# Patient Record
Sex: FEMALE | Race: WHITE | NOT HISPANIC OR LATINO | Employment: OTHER
[De-identification: names, ages, dates, MRNs, and addresses within clinical notes are randomized per-mention and may not be internally consistent; named-entity substitution may affect disease eponyms.]

---

## 2016-12-01 LAB — HBA1C MFR BLD: 11.9 % (ref 4.3–5.6)

## 2017-04-18 ENCOUNTER — RECORDS - HEALTHEAST (OUTPATIENT)
Dept: ADMINISTRATIVE | Facility: OTHER | Age: 67
End: 2017-04-18

## 2017-04-18 LAB — HBA1C MFR BLD: 5.3 % (ref 4.3–5.6)

## 2017-06-01 ENCOUNTER — RECORDS - HEALTHEAST (OUTPATIENT)
Dept: ADMINISTRATIVE | Facility: OTHER | Age: 67
End: 2017-06-01

## 2017-06-01 LAB — HBA1C MFR BLD: 5.5 % (ref 4.3–5.6)

## 2017-07-19 ENCOUNTER — RECORDS - HEALTHEAST (OUTPATIENT)
Dept: ADMINISTRATIVE | Facility: OTHER | Age: 67
End: 2017-07-19

## 2017-09-19 ENCOUNTER — RECORDS - HEALTHEAST (OUTPATIENT)
Dept: ADMINISTRATIVE | Facility: OTHER | Age: 67
End: 2017-09-19

## 2017-09-25 ENCOUNTER — RECORDS - HEALTHEAST (OUTPATIENT)
Dept: ADMINISTRATIVE | Facility: OTHER | Age: 67
End: 2017-09-25

## 2017-10-16 ENCOUNTER — RECORDS - HEALTHEAST (OUTPATIENT)
Dept: ADMINISTRATIVE | Facility: OTHER | Age: 67
End: 2017-10-16

## 2017-10-24 ENCOUNTER — RECORDS - HEALTHEAST (OUTPATIENT)
Dept: ADMINISTRATIVE | Facility: OTHER | Age: 67
End: 2017-10-24

## 2017-10-24 LAB — HBA1C MFR BLD: 5.9 % (ref 4.3–5.6)

## 2017-11-13 ENCOUNTER — RECORDS - HEALTHEAST (OUTPATIENT)
Dept: ADMINISTRATIVE | Facility: OTHER | Age: 67
End: 2017-11-13

## 2017-11-20 ENCOUNTER — RECORDS - HEALTHEAST (OUTPATIENT)
Dept: ADMINISTRATIVE | Facility: OTHER | Age: 67
End: 2017-11-20

## 2018-02-13 ENCOUNTER — RECORDS - HEALTHEAST (OUTPATIENT)
Dept: ADMINISTRATIVE | Facility: OTHER | Age: 68
End: 2018-02-13

## 2018-02-13 ENCOUNTER — AMBULATORY - HEALTHEAST (OUTPATIENT)
Dept: MULTI SPECIALTY CLINIC | Facility: CLINIC | Age: 68
End: 2018-02-13

## 2018-04-09 ENCOUNTER — RECORDS - HEALTHEAST (OUTPATIENT)
Dept: ADMINISTRATIVE | Facility: OTHER | Age: 68
End: 2018-04-09

## 2018-11-08 ENCOUNTER — RECORDS - HEALTHEAST (OUTPATIENT)
Dept: ADMINISTRATIVE | Facility: OTHER | Age: 68
End: 2018-11-08

## 2018-11-08 LAB — HBA1C MFR BLD: 6.8 % (ref 4.3–5.6)

## 2018-11-12 ENCOUNTER — RECORDS - HEALTHEAST (OUTPATIENT)
Dept: ADMINISTRATIVE | Facility: OTHER | Age: 68
End: 2018-11-12

## 2018-12-20 ENCOUNTER — RECORDS - HEALTHEAST (OUTPATIENT)
Dept: ADMINISTRATIVE | Facility: OTHER | Age: 68
End: 2018-12-20

## 2018-12-20 ENCOUNTER — AMBULATORY - HEALTHEAST (OUTPATIENT)
Dept: MULTI SPECIALTY CLINIC | Facility: CLINIC | Age: 68
End: 2018-12-20

## 2018-12-20 LAB
RETINOPATHY: NORMAL
RETINOPATHY: NORMAL

## 2019-06-06 ENCOUNTER — RECORDS - HEALTHEAST (OUTPATIENT)
Dept: ADMINISTRATIVE | Facility: OTHER | Age: 69
End: 2019-06-06

## 2019-10-28 ENCOUNTER — RECORDS - HEALTHEAST (OUTPATIENT)
Dept: ADMINISTRATIVE | Facility: OTHER | Age: 69
End: 2019-10-28

## 2019-11-21 ENCOUNTER — OFFICE VISIT - HEALTHEAST (OUTPATIENT)
Dept: FAMILY MEDICINE | Facility: CLINIC | Age: 69
End: 2019-11-21

## 2019-11-21 DIAGNOSIS — E03.9 ACQUIRED HYPOTHYROIDISM: ICD-10-CM

## 2019-11-21 DIAGNOSIS — E11.9 CONTROLLED TYPE 2 DIABETES MELLITUS WITHOUT COMPLICATION, WITHOUT LONG-TERM CURRENT USE OF INSULIN (H): ICD-10-CM

## 2019-11-21 DIAGNOSIS — I10 ESSENTIAL HYPERTENSION: ICD-10-CM

## 2019-11-21 DIAGNOSIS — Z00.00 ROUTINE GENERAL MEDICAL EXAMINATION AT A HEALTH CARE FACILITY: ICD-10-CM

## 2019-11-21 DIAGNOSIS — Z87.39 HISTORY OF GOUT: ICD-10-CM

## 2019-11-21 DIAGNOSIS — Z12.31 SCREENING MAMMOGRAM, ENCOUNTER FOR: ICD-10-CM

## 2019-11-21 DIAGNOSIS — K22.2 ESOPHAGEAL STRICTURE: ICD-10-CM

## 2019-11-21 DIAGNOSIS — E78.2 MIXED HYPERLIPIDEMIA: ICD-10-CM

## 2019-11-21 DIAGNOSIS — Z23 FLU VACCINE NEED: ICD-10-CM

## 2019-11-21 DIAGNOSIS — I25.10 ASCVD (ARTERIOSCLEROTIC CARDIOVASCULAR DISEASE): ICD-10-CM

## 2019-11-21 DIAGNOSIS — D12.6 TUBULAR ADENOMA OF COLON: ICD-10-CM

## 2019-11-21 LAB
ALBUMIN SERPL-MCNC: 4.1 G/DL (ref 3.5–5)
ALP SERPL-CCNC: 92 U/L (ref 45–120)
ALT SERPL W P-5'-P-CCNC: 28 U/L (ref 0–45)
ANION GAP SERPL CALCULATED.3IONS-SCNC: 8 MMOL/L (ref 5–18)
AST SERPL W P-5'-P-CCNC: 25 U/L (ref 0–40)
BILIRUB SERPL-MCNC: 0.6 MG/DL (ref 0–1)
BUN SERPL-MCNC: 7 MG/DL (ref 8–28)
CALCIUM SERPL-MCNC: 10.1 MG/DL (ref 8.5–10.5)
CHLORIDE BLD-SCNC: 107 MMOL/L (ref 98–107)
CHOLEST SERPL-MCNC: 139 MG/DL
CO2 SERPL-SCNC: 27 MMOL/L (ref 22–31)
CREAT SERPL-MCNC: 0.93 MG/DL (ref 0.6–1.1)
FASTING STATUS PATIENT QL REPORTED: YES
GFR SERPL CREATININE-BSD FRML MDRD: 59 ML/MIN/1.73M2
GLUCOSE BLD-MCNC: 116 MG/DL (ref 70–125)
HBA1C MFR BLD: 6 % (ref 3.5–6)
HDLC SERPL-MCNC: 44 MG/DL
LDLC SERPL CALC-MCNC: 63 MG/DL
POTASSIUM BLD-SCNC: 5.1 MMOL/L (ref 3.5–5)
PROT SERPL-MCNC: 7.5 G/DL (ref 6–8)
SODIUM SERPL-SCNC: 142 MMOL/L (ref 136–145)
TRIGL SERPL-MCNC: 162 MG/DL
TSH SERPL DL<=0.005 MIU/L-ACNC: 2.75 UIU/ML (ref 0.3–5)

## 2019-11-21 RX ORDER — ASPIRIN 325 MG
325 TABLET ORAL
Status: SHIPPED | COMMUNITY
Start: 2019-11-21

## 2019-11-21 RX ORDER — INDOMETHACIN 25 MG/1
25 CAPSULE ORAL PRN
Status: SHIPPED | COMMUNITY
Start: 2014-06-30 | End: 2021-12-29

## 2019-11-21 ASSESSMENT — MIFFLIN-ST. JEOR: SCORE: 1390.27

## 2019-11-21 NOTE — ASSESSMENT & PLAN NOTE
Recommended that the patient take her omeprazole daily. Repeat endoscopy refused. Discussed having NTG on hand to take in case of an episode of un resolving symptoms but this was refused. She will continue with eating slowly which is working well.

## 2019-11-21 NOTE — ASSESSMENT & PLAN NOTE
Asymptomatic at this time. Continue risk factor management. Recommended that the patient have NTG at home in case of need but this was refused.

## 2019-11-22 ENCOUNTER — RECORDS - HEALTHEAST (OUTPATIENT)
Dept: MULTI SPECIALTY CLINIC | Facility: CLINIC | Age: 69
End: 2019-11-22

## 2019-11-22 ENCOUNTER — COMMUNICATION - HEALTHEAST (OUTPATIENT)
Dept: FAMILY MEDICINE | Facility: CLINIC | Age: 69
End: 2019-11-22

## 2019-11-22 DIAGNOSIS — E11.9 CONTROLLED TYPE 2 DIABETES MELLITUS WITHOUT COMPLICATION, WITHOUT LONG-TERM CURRENT USE OF INSULIN (H): ICD-10-CM

## 2019-11-22 LAB — HCV AB SERPL QL IA: NEGATIVE

## 2019-11-28 ENCOUNTER — RECORDS - HEALTHEAST (OUTPATIENT)
Dept: ADMINISTRATIVE | Facility: OTHER | Age: 69
End: 2019-11-28

## 2019-12-10 ENCOUNTER — RECORDS - HEALTHEAST (OUTPATIENT)
Dept: HEALTH INFORMATION MANAGEMENT | Facility: CLINIC | Age: 69
End: 2019-12-10

## 2019-12-19 ENCOUNTER — RECORDS - HEALTHEAST (OUTPATIENT)
Dept: ADMINISTRATIVE | Facility: OTHER | Age: 69
End: 2019-12-19

## 2020-01-16 ENCOUNTER — COMMUNICATION - HEALTHEAST (OUTPATIENT)
Dept: FAMILY MEDICINE | Facility: CLINIC | Age: 70
End: 2020-01-16

## 2020-05-07 ENCOUNTER — COMMUNICATION - HEALTHEAST (OUTPATIENT)
Dept: FAMILY MEDICINE | Facility: CLINIC | Age: 70
End: 2020-05-07

## 2020-11-16 ENCOUNTER — COMMUNICATION - HEALTHEAST (OUTPATIENT)
Dept: FAMILY MEDICINE | Facility: CLINIC | Age: 70
End: 2020-11-16

## 2020-11-16 DIAGNOSIS — I10 ESSENTIAL HYPERTENSION: ICD-10-CM

## 2020-11-16 DIAGNOSIS — E11.9 CONTROLLED TYPE 2 DIABETES MELLITUS WITHOUT COMPLICATION, WITHOUT LONG-TERM CURRENT USE OF INSULIN (H): ICD-10-CM

## 2020-11-16 DIAGNOSIS — E78.2 MIXED HYPERLIPIDEMIA: ICD-10-CM

## 2020-11-16 DIAGNOSIS — E03.9 ACQUIRED HYPOTHYROIDISM: ICD-10-CM

## 2020-11-23 ENCOUNTER — COMMUNICATION - HEALTHEAST (OUTPATIENT)
Dept: FAMILY MEDICINE | Facility: CLINIC | Age: 70
End: 2020-11-23

## 2021-01-21 ENCOUNTER — OFFICE VISIT - HEALTHEAST (OUTPATIENT)
Dept: FAMILY MEDICINE | Facility: CLINIC | Age: 71
End: 2021-01-21

## 2021-01-21 DIAGNOSIS — E78.2 MIXED HYPERLIPIDEMIA: ICD-10-CM

## 2021-01-21 DIAGNOSIS — N18.31 STAGE 3A CHRONIC KIDNEY DISEASE (H): ICD-10-CM

## 2021-01-21 DIAGNOSIS — E11.9 CONTROLLED TYPE 2 DIABETES MELLITUS WITHOUT COMPLICATION, WITHOUT LONG-TERM CURRENT USE OF INSULIN (H): ICD-10-CM

## 2021-01-21 DIAGNOSIS — K21.9 GASTROESOPHAGEAL REFLUX DISEASE WITHOUT ESOPHAGITIS: ICD-10-CM

## 2021-01-21 DIAGNOSIS — J30.0 VASOMOTOR RHINITIS: ICD-10-CM

## 2021-01-21 DIAGNOSIS — M75.02 ADHESIVE CAPSULITIS OF LEFT SHOULDER: ICD-10-CM

## 2021-01-21 DIAGNOSIS — Z12.31 SCREENING MAMMOGRAM, ENCOUNTER FOR: ICD-10-CM

## 2021-01-21 DIAGNOSIS — D12.6 TUBULAR ADENOMA OF COLON: ICD-10-CM

## 2021-01-21 DIAGNOSIS — I25.10 ASCVD (ARTERIOSCLEROTIC CARDIOVASCULAR DISEASE): ICD-10-CM

## 2021-01-21 DIAGNOSIS — Z00.00 ROUTINE GENERAL MEDICAL EXAMINATION AT A HEALTH CARE FACILITY: ICD-10-CM

## 2021-01-21 DIAGNOSIS — I10 ESSENTIAL HYPERTENSION: ICD-10-CM

## 2021-01-21 DIAGNOSIS — E03.9 ACQUIRED HYPOTHYROIDISM: ICD-10-CM

## 2021-01-21 LAB
ALBUMIN SERPL-MCNC: 4.3 G/DL (ref 3.5–5)
ALP SERPL-CCNC: 83 U/L (ref 45–120)
ALT SERPL W P-5'-P-CCNC: 54 U/L (ref 0–45)
ANION GAP SERPL CALCULATED.3IONS-SCNC: 11 MMOL/L (ref 5–18)
AST SERPL W P-5'-P-CCNC: 43 U/L (ref 0–40)
BILIRUB SERPL-MCNC: 0.7 MG/DL (ref 0–1)
BUN SERPL-MCNC: 8 MG/DL (ref 8–28)
CALCIUM SERPL-MCNC: 9.5 MG/DL (ref 8.5–10.5)
CHLORIDE BLD-SCNC: 108 MMOL/L (ref 98–107)
CHOLEST SERPL-MCNC: 158 MG/DL
CO2 SERPL-SCNC: 24 MMOL/L (ref 22–31)
CREAT SERPL-MCNC: 1.03 MG/DL (ref 0.6–1.1)
CREAT UR-MCNC: 93.9 MG/DL
FASTING STATUS PATIENT QL REPORTED: YES
GFR SERPL CREATININE-BSD FRML MDRD: 53 ML/MIN/1.73M2
GLUCOSE BLD-MCNC: 109 MG/DL (ref 70–125)
HBA1C MFR BLD: 6.1 %
HDLC SERPL-MCNC: 46 MG/DL
LDLC SERPL CALC-MCNC: 83 MG/DL
MICROALBUMIN UR-MCNC: 3.69 MG/DL (ref 0–1.99)
MICROALBUMIN/CREAT UR: 39.3 MG/G
POTASSIUM BLD-SCNC: 4.1 MMOL/L (ref 3.5–5)
PROT SERPL-MCNC: 7.4 G/DL (ref 6–8)
SODIUM SERPL-SCNC: 143 MMOL/L (ref 136–145)
TRIGL SERPL-MCNC: 143 MG/DL
TSH SERPL DL<=0.005 MIU/L-ACNC: 2.43 UIU/ML (ref 0.3–5)

## 2021-01-21 RX ORDER — LEVOTHYROXINE SODIUM 100 UG/1
100 TABLET ORAL DAILY
Qty: 90 TABLET | Refills: 3 | Status: SHIPPED | OUTPATIENT
Start: 2021-01-21 | End: 2022-02-08

## 2021-01-21 RX ORDER — METFORMIN HCL 500 MG
1000 TABLET, EXTENDED RELEASE 24 HR ORAL 2 TIMES DAILY
Qty: 360 TABLET | Refills: 3 | Status: SHIPPED | OUTPATIENT
Start: 2021-01-21 | End: 2022-02-08

## 2021-01-21 RX ORDER — OMEPRAZOLE 20 MG/1
20 TABLET, DELAYED RELEASE ORAL PRN
Qty: 90 TABLET | Refills: 3 | Status: SHIPPED | OUTPATIENT
Start: 2021-01-21 | End: 2022-02-08

## 2021-01-21 RX ORDER — LISINOPRIL 20 MG/1
20 TABLET ORAL DAILY
Qty: 90 TABLET | Refills: 3 | Status: SHIPPED | OUTPATIENT
Start: 2021-01-21 | End: 2022-02-08

## 2021-01-21 RX ORDER — ATENOLOL 100 MG/1
100 TABLET ORAL DAILY
Qty: 90 TABLET | Refills: 3 | Status: SHIPPED | OUTPATIENT
Start: 2021-01-21 | End: 2022-02-08

## 2021-01-21 ASSESSMENT — MIFFLIN-ST. JEOR: SCORE: 1414.15

## 2021-01-21 NOTE — ASSESSMENT & PLAN NOTE
No history of traumatic injury. Treatment options discussed. Order placed for physical therapy. She was also given exercises to start at home. If not improving, ortho referral would be the next step.

## 2021-01-24 ENCOUNTER — COMMUNICATION - HEALTHEAST (OUTPATIENT)
Dept: FAMILY MEDICINE | Facility: CLINIC | Age: 71
End: 2021-01-24

## 2021-06-03 VITALS
BODY MASS INDEX: 32.34 KG/M2 | DIASTOLIC BLOOD PRESSURE: 82 MMHG | HEIGHT: 65 IN | OXYGEN SATURATION: 95 % | HEART RATE: 60 BPM | SYSTOLIC BLOOD PRESSURE: 138 MMHG | WEIGHT: 194.1 LBS

## 2021-06-03 NOTE — PROGRESS NOTES
Assessment and Plan:     Problem List Items Addressed This Visit     Acquired hypothyroidism     TSH today. Dosing based on results.         Relevant Medications    levothyroxine (SYNTHROID, LEVOTHROID) 100 MCG tablet    Other Relevant Orders    Thyroid Stimulating Hormone (TSH)    ASCVD (arteriosclerotic cardiovascular disease)     Asymptomatic at this time. Continue risk factor management. Recommended that the patient have NTG at home in case of need but this was refused.         Controlled type 2 diabetes mellitus without complication, without long-term current use of insulin (H)     Metformin 1000 mg twice daily. A1C today. Plan and follow up will be based on results.         Relevant Medications    blood glucose test (GLUCOSE BLOOD) strips    metFORMIN (GLUCOPHAGE-XR) 500 MG 24 hr tablet    Other Relevant Orders    Glycosylated Hemoglobin A1c    Comprehensive Metabolic Panel    Esophageal stricture     Recommended that the patient take her omeprazole daily. Repeat endoscopy refused. Discussed having NTG on hand to take in case of an episode of un resolving symptoms but this was refused. She will continue with eating slowly which is working well.         Essential hypertension     Well controlled. Continue Atenolol 100 mg daily and lisinopril 20 mg daily.         Relevant Medications    atenolol (TENORMIN) 100 MG tablet    lisinopril (PRINIVIL,ZESTRIL) 20 MG tablet    Other Relevant Orders    Comprehensive Metabolic Panel    History of gout     Currently asymptomatic. Indomethacin as needed.         Mixed hyperlipidemia     Continue atorvastatin 40 mg daily. Lipid panel and CMP today.         Relevant Medications    atorvastatin (LIPITOR) 40 MG tablet    Other Relevant Orders    Lipid Profile    Comprehensive Metabolic Panel    Tubular adenoma of colon     Colonoscopy up to date. Last done 7/2016.           Other Visit Diagnoses     Routine general medical examination at a health care facility    -  Primary     Relevant Orders    Hepatitis C Antibody (Anti-HCV)    Screening mammogram, encounter for        Relevant Orders    Mammo Screening Bilateral            The patient's current medical problems were reviewed.    I have had an Advance Directives discussion with the patient.  The following health maintenance schedule was reviewed with the patient and provided in printed form in the after visit summary:   Health Maintenance   Topic Date Due     HEPATITIS C SCREENING  1950     BMP  1950     DIABETIC FOOT EXAM  1950     MAMMOGRAM  1950     LIPID  1950     A1C  1950     DIABETIC EYE EXAM  1950     MICROALBUMIN  1950     ZOSTER VACCINES (2 of 3) 07/17/2012     DXA SCAN  03/08/2013     INFLUENZA VACCINE RULE BASED (1) 08/01/2019     TD 18+ HE  03/12/2020     MEDICARE ANNUAL WELLNESS VISIT  11/21/2020     FALL RISK ASSESSMENT  11/21/2020     COLONOSCOPY  07/25/2021     ADVANCE CARE PLANNING  11/21/2024     PNEUMOCOCCAL IMMUNIZATION 65+ LOW/MEDIUM RISK  Completed        Subjective:   Chief Complaint: Prabha Cabral is an 69 y.o. female here for an Annual Wellness visit.   HPI:  She also needs follow up on her diabetes. She ran out of test strips in July so hasn't checked since then. Numbers were well controlled at that time. She has been taking her medications regularly.    She also has a history of esophageal stricture. She has had dilatation in the past but it hasn't been very helpful. She reports that she does OK most of the time if she eats very slowly. She refuses repeat endoscopy at this time. She isn't taking the omeprazole daily as she denies reflux symptoms.    Review of Systems:  Please see above.  The rest of the review of systems are negative for all systems.    Patient Care Team:  Provider, No Primary Care as PCP - General     Patient Active Problem List   Diagnosis     ASCVD (arteriosclerotic cardiovascular disease)     History of gout     Controlled type 2  diabetes mellitus without complication, without long-term current use of insulin (H)     Esophageal stricture     Essential hypertension     Mixed hyperlipidemia     Acquired hypothyroidism     Rosacea, acne     Tubular adenoma of colon     History reviewed. No pertinent past medical history.   Past Surgical History:   Procedure Laterality Date     CORONARY STENT PLACEMENT       ESOPHAGEAL DILATION  x 3      Family History   Problem Relation Age of Onset     Stroke Father      Heart disease Father      No Medical Problems Sister      No Medical Problems Brother      No Medical Problems Brother      No Medical Problems Brother      Parkinsonism Brother      Gout Brother      No Medical Problems Sister      Lumbar disc disease Daughter       Social History     Socioeconomic History     Marital status:      Spouse name: Not on file     Number of children: Not on file     Years of education: Not on file     Highest education level: Not on file   Occupational History     Not on file   Social Needs     Financial resource strain: Not on file     Food insecurity:     Worry: Not on file     Inability: Not on file     Transportation needs:     Medical: Not on file     Non-medical: Not on file   Tobacco Use     Smoking status: Former Smoker     Last attempt to quit: 1999     Years since quittin.9     Smokeless tobacco: Never Used   Substance and Sexual Activity     Alcohol use: Yes     Alcohol/week: 0.0 - 1.0 standard drinks     Comment: about twice per year     Drug use: Never     Sexual activity: Not on file   Lifestyle     Physical activity:     Days per week: Not on file     Minutes per session: Not on file     Stress: Not on file   Relationships     Social connections:     Talks on phone: Not on file     Gets together: Not on file     Attends Bahai service: Not on file     Active member of club or organization: Not on file     Attends meetings of clubs or organizations: Not on file     Relationship  "status: Not on file     Intimate partner violence:     Fear of current or ex partner: Not on file     Emotionally abused: Not on file     Physically abused: Not on file     Forced sexual activity: Not on file   Other Topics Concern     Not on file   Social History Narrative     Not on file      Current Outpatient Medications   Medication Sig Dispense Refill     aspirin 325 MG tablet Take 325 mg by mouth.       atenolol (TENORMIN) 100 MG tablet Take 1 tablet (100 mg total) by mouth daily. 90 tablet 3     atorvastatin (LIPITOR) 40 MG tablet Take 1 tablet (40 mg total) by mouth daily. 90 tablet 3     blood glucose test (GLUCOSE BLOOD) strips Use 1 each As Directed 2 (two) times a day. Dispense brand per patient's insurance at pharmacy discretion. 200 strip 11     generic lancets Use  to test daily. Use as directed. Pharmacy dispense brand based on insurance.       indomethacin (INDOCIN) 25 MG capsule Take 25 mg by mouth.       levothyroxine (SYNTHROID, LEVOTHROID) 100 MCG tablet Take 1 tablet (100 mcg total) by mouth daily. 90 tablet 3     lisinopril (PRINIVIL,ZESTRIL) 20 MG tablet Take 1 tablet (20 mg total) by mouth daily. 90 tablet 3     metFORMIN (GLUCOPHAGE-XR) 500 MG 24 hr tablet Take 2 tablets (1,000 mg total) by mouth 2 (two) times a day. 360 tablet 3     omeprazole (PRILOSEC OTC) 20 MG tablet Take 20 mg by mouth.       No current facility-administered medications for this visit.       Objective:   Vital Signs:   Visit Vitals  /82 (Patient Site: Left Arm, Patient Position: Sitting, Cuff Size: Adult Large)   Pulse 60   Ht 5' 5.25\" (1.657 m)   Wt 194 lb 1.6 oz (88 kg)   SpO2 95% Comment: RA   Breastfeeding No   BMI 32.05 kg/m         VisionScreening:  No exam data present     PHYSICAL EXAM  Physical Exam  Constitutional:       General: She is not in acute distress.  HENT:      Right Ear: Tympanic membrane and ear canal normal.      Left Ear: Tympanic membrane and ear canal normal.   Eyes:      " Conjunctiva/sclera: Conjunctivae normal.      Pupils: Pupils are equal, round, and reactive to light.   Neck:      Musculoskeletal: Normal range of motion and neck supple.      Thyroid: No thyromegaly.      Vascular: No carotid bruit.   Cardiovascular:      Rate and Rhythm: Normal rate and regular rhythm.      Pulses:           Dorsalis pedis pulses are 3+ on the right side and 3+ on the left side.      Heart sounds: Normal heart sounds. No murmur.   Pulmonary:      Effort: Pulmonary effort is normal.      Breath sounds: Normal breath sounds. No wheezing or rales.   Chest:      Breasts:         Right: No inverted nipple, mass or skin change.         Left: No inverted nipple, mass or skin change.   Abdominal:      General: Bowel sounds are normal. There is no distension.      Palpations: Abdomen is soft. There is no mass.      Tenderness: There is no abdominal tenderness. There is no guarding or rebound.      Hernia: There is no hernia in the ventral area.   Musculoskeletal:         General: No deformity.      Right foot: Normal range of motion. No deformity.      Left foot: Normal range of motion. No deformity.   Feet:      Right foot:      Protective Sensation: 10 sites tested. 10 sites sensed.      Skin integrity: Skin integrity normal.      Toenail Condition: Right toenails are abnormally thick. Fungal disease present.     Left foot:      Protective Sensation: 10 sites tested. 10 sites sensed.      Skin integrity: Skin integrity normal.      Toenail Condition: Left toenails are abnormally thick. Fungal disease present.  Lymphadenopathy:      Cervical: No cervical adenopathy.   Skin:     Findings: No rash.   Neurological:      Mental Status: She is alert and oriented to person, place, and time.      Cranial Nerves: No cranial nerve deficit.      Motor: No tremor or abnormal muscle tone.      Gait: Gait normal.      Deep Tendon Reflexes:      Reflex Scores:       Patellar reflexes are 2+ on the right side and 2+ on  the left side.  Psychiatric:         Behavior: Behavior normal.         Thought Content: Thought content normal.         Judgment: Judgment normal.          Assessment Results 11/21/2019   Activities of Daily Living No help needed   Instrumental Activities of Daily Living No help needed   Mini Cog Total Score 5     A Mini-Cog score of 0-2 suggests the possibility of dementia, score of 3-5 suggests no dementia    Identified Health Risks:     Patient's advanced directive was discussed and I am comfortable with the patient's wishes.

## 2021-06-03 NOTE — PATIENT INSTRUCTIONS - HE
We will notify you of lab results.  Please schedule mammogram at your convenience.     Advance Directive  Patient s advance directive was discussed and I am comfortable with the patient s wishes.  Patient Education   Personalized Prevention Plan  You are due for the preventive services outlined below.  Your care team is available to assist you in scheduling these services.  If you have already completed any of these items, please share that information with your care team to update in your medical record.  Health Maintenance   Topic Date Due     HEPATITIS C SCREENING  1950     MAMMOGRAM  1950     ADVANCE CARE PLANNING  03/08/1966     LIPID  03/08/1993     COLONOSCOPY  03/08/1998     ZOSTER VACCINES (2 of 3) 07/17/2012     MEDICARE ANNUAL WELLNESS VISIT  03/08/2013     DXA SCAN  03/08/2013     FALL RISK ASSESSMENT  03/08/2013     INFLUENZA VACCINE RULE BASED (1) 08/01/2019     TD 18+ HE  03/12/2020     PNEUMOCOCCAL IMMUNIZATION 65+ LOW/MEDIUM RISK  Completed

## 2021-06-05 ENCOUNTER — COMMUNICATION - HEALTHEAST (OUTPATIENT)
Dept: FAMILY MEDICINE | Facility: CLINIC | Age: 71
End: 2021-06-05

## 2021-06-05 VITALS
DIASTOLIC BLOOD PRESSURE: 76 MMHG | HEART RATE: 64 BPM | BODY MASS INDEX: 33.19 KG/M2 | RESPIRATION RATE: 16 BRPM | HEIGHT: 65 IN | TEMPERATURE: 97.9 F | SYSTOLIC BLOOD PRESSURE: 126 MMHG | WEIGHT: 199.19 LBS

## 2021-06-05 DIAGNOSIS — E78.2 MIXED HYPERLIPIDEMIA: ICD-10-CM

## 2021-06-07 RX ORDER — ATORVASTATIN CALCIUM 40 MG/1
TABLET, FILM COATED ORAL
Qty: 90 TABLET | Refills: 1 | Status: SHIPPED | OUTPATIENT
Start: 2021-06-07 | End: 2022-02-08

## 2021-06-08 NOTE — TELEPHONE ENCOUNTER
Called patient to schedule a VV or telephone visit for Diabetes per recall list.  Patient refused video or telephone visit at this time.

## 2021-06-13 NOTE — TELEPHONE ENCOUNTER
Patient overdue to be seen for recheck of diabetes, physical, and labs. Please contact her to schedule. Medications refilled for 3 months.

## 2021-06-13 NOTE — TELEPHONE ENCOUNTER
Due to be seen with labs      Rx renewed per Medication Renewal Policy. Medication was last renewed on 11/21/19.    Amber Kruger, [facility] Triage/Med Refill 11/17/2020     Requested Prescriptions   Pending Prescriptions Disp Refills     levothyroxine (SYNTHROID, LEVOTHROID) 100 MCG tablet [Pharmacy Med Name: LEVOTHYROXINE 0.100MG (100MCG) TAB] 90 tablet 3     Sig: TAKE 1 TABLET BY MOUTH EVERY DAY       Thyroid Hormones Protocol Passed - 11/16/2020  4:11 PM        Passed - Provider visit in past 12 months or next 3 months     Last office visit with prescriber/PCP: Visit date not found OR same dept: Visit date not found OR same specialty: Visit date not found  Last physical: 11/21/2019 Last MTM visit: Visit date not found   Next visit within 3 mo: Visit date not found  Next physical within 3 mo: Visit date not found  Prescriber OR PCP: Ann Marie Quinn MD  Last diagnosis associated with med order: 1. Acquired hypothyroidism  - levothyroxine (SYNTHROID, LEVOTHROID) 100 MCG tablet [Pharmacy Med Name: LEVOTHYROXINE 0.100MG (100MCG) TAB]; TAKE 1 TABLET BY MOUTH EVERY DAY  Dispense: 90 tablet; Refill: 3    2. Mixed hyperlipidemia  - atorvastatin (LIPITOR) 40 MG tablet [Pharmacy Med Name: ATORVASTATIN 40MG TABLETS]; TAKE 1 TABLET BY MOUTH DAILY  Dispense: 90 tablet; Refill: 3    3. Essential hypertension  - lisinopriL (PRINIVIL,ZESTRIL) 20 MG tablet [Pharmacy Med Name: LISINOPRIL 20MG TABLETS]; TAKE 1 TABLET BY MOUTH DAILY  Dispense: 90 tablet; Refill: 3  - atenoloL (TENORMIN) 100 MG tablet [Pharmacy Med Name: ATENOLOL 100MG TABLETS]; TAKE 1 TABLET BY MOUTH EVERY DAY  Dispense: 90 tablet; Refill: 3    4. Controlled type 2 diabetes mellitus without complication, without long-term current use of insulin (H)  - metFORMIN (GLUCOPHAGE-XR) 500 MG 24 hr tablet [Pharmacy Med Name: METFORMIN ER 500MG 24HR TABS]; TAKE 2 TABLETS BY MOUTH TWICE DAILY  Dispense: 360 tablet; Refill: 3    If protocol passes may refill for 12 months  if within 3 months of last provider visit (or a total of 15 months).             Passed - TSH on file in past 12 months for patient age 12 & older     TSH   Date Value Ref Range Status   11/21/2019 2.75 0.30 - 5.00 uIU/mL Final                      atorvastatin (LIPITOR) 40 MG tablet [Pharmacy Med Name: ATORVASTATIN 40MG TABLETS] 90 tablet 3     Sig: TAKE 1 TABLET BY MOUTH DAILY       Statins Refill Protocol (Hmg CoA Reductase Inhibitors) Passed - 11/16/2020  4:11 PM        Passed - PCP or prescribing provider visit in past 12 months      Last office visit with prescriber/PCP: Visit date not found OR same dept: Visit date not found OR same specialty: Visit date not found  Last physical: 11/21/2019 Last MTM visit: Visit date not found   Next visit within 3 mo: Visit date not found  Next physical within 3 mo: Visit date not found  Prescriber OR PCP: Ann Marie Quinn MD  Last diagnosis associated with med order: 1. Acquired hypothyroidism  - levothyroxine (SYNTHROID, LEVOTHROID) 100 MCG tablet [Pharmacy Med Name: LEVOTHYROXINE 0.100MG (100MCG) TAB]; TAKE 1 TABLET BY MOUTH EVERY DAY  Dispense: 90 tablet; Refill: 3    2. Mixed hyperlipidemia  - atorvastatin (LIPITOR) 40 MG tablet [Pharmacy Med Name: ATORVASTATIN 40MG TABLETS]; TAKE 1 TABLET BY MOUTH DAILY  Dispense: 90 tablet; Refill: 3    3. Essential hypertension  - lisinopriL (PRINIVIL,ZESTRIL) 20 MG tablet [Pharmacy Med Name: LISINOPRIL 20MG TABLETS]; TAKE 1 TABLET BY MOUTH DAILY  Dispense: 90 tablet; Refill: 3  - atenoloL (TENORMIN) 100 MG tablet [Pharmacy Med Name: ATENOLOL 100MG TABLETS]; TAKE 1 TABLET BY MOUTH EVERY DAY  Dispense: 90 tablet; Refill: 3    4. Controlled type 2 diabetes mellitus without complication, without long-term current use of insulin (H)  - metFORMIN (GLUCOPHAGE-XR) 500 MG 24 hr tablet [Pharmacy Med Name: METFORMIN ER 500MG 24HR TABS]; TAKE 2 TABLETS BY MOUTH TWICE DAILY  Dispense: 360 tablet; Refill: 3    If protocol passes may refill for 12  months if within 3 months of last provider visit (or a total of 15 months).                lisinopriL (PRINIVIL,ZESTRIL) 20 MG tablet [Pharmacy Med Name: LISINOPRIL 20MG TABLETS] 90 tablet 3     Sig: TAKE 1 TABLET BY MOUTH DAILY       Ace Inhibitors Refill Protocol Passed - 11/16/2020  4:11 PM        Passed - PCP or prescribing provider visit in past 12 months       Last office visit with prescriber/PCP: Visit date not found OR same dept: Visit date not found OR same specialty: Visit date not found  Last physical: 11/21/2019 Last MTM visit: Visit date not found   Next visit within 3 mo: Visit date not found  Next physical within 3 mo: Visit date not found  Prescriber OR PCP: Ann Marie Quinn MD  Last diagnosis associated with med order: 1. Acquired hypothyroidism  - levothyroxine (SYNTHROID, LEVOTHROID) 100 MCG tablet [Pharmacy Med Name: LEVOTHYROXINE 0.100MG (100MCG) TAB]; TAKE 1 TABLET BY MOUTH EVERY DAY  Dispense: 90 tablet; Refill: 3    2. Mixed hyperlipidemia  - atorvastatin (LIPITOR) 40 MG tablet [Pharmacy Med Name: ATORVASTATIN 40MG TABLETS]; TAKE 1 TABLET BY MOUTH DAILY  Dispense: 90 tablet; Refill: 3    3. Essential hypertension  - lisinopriL (PRINIVIL,ZESTRIL) 20 MG tablet [Pharmacy Med Name: LISINOPRIL 20MG TABLETS]; TAKE 1 TABLET BY MOUTH DAILY  Dispense: 90 tablet; Refill: 3  - atenoloL (TENORMIN) 100 MG tablet [Pharmacy Med Name: ATENOLOL 100MG TABLETS]; TAKE 1 TABLET BY MOUTH EVERY DAY  Dispense: 90 tablet; Refill: 3    4. Controlled type 2 diabetes mellitus without complication, without long-term current use of insulin (H)  - metFORMIN (GLUCOPHAGE-XR) 500 MG 24 hr tablet [Pharmacy Med Name: METFORMIN ER 500MG 24HR TABS]; TAKE 2 TABLETS BY MOUTH TWICE DAILY  Dispense: 360 tablet; Refill: 3    If protocol passes may refill for 12 months if within 3 months of last provider visit (or a total of 15 months).             Passed - Serum Potassium in past 12 months     Lab Results   Component Value Date     Potassium 5.1 (H) 11/21/2019             Passed - Blood pressure filed in past 12 months     BP Readings from Last 1 Encounters:   11/21/19 138/82             Passed - Serum Creatinine in past 12 months     Creatinine   Date Value Ref Range Status   11/21/2019 0.93 0.60 - 1.10 mg/dL Final                metFORMIN (GLUCOPHAGE-XR) 500 MG 24 hr tablet [Pharmacy Med Name: METFORMIN ER 500MG 24HR TABS] 360 tablet 3     Sig: TAKE 2 TABLETS BY MOUTH TWICE DAILY       Metformin Refill Protocol Failed - 11/16/2020  4:11 PM        Failed - Visit with PCP or prescribing provider visit in last 6 months or next 3 months     Last office visit with prescriber/PCP: Visit date not found OR same dept: Visit date not found OR same specialty: Visit date not found Last physical: Visit date not found Last MTM visit: Visit date not found         Next appt within 3 mo: Visit date not found  Next physical within 3 mo: Visit date not found  Prescriber OR PCP: Ann Marie Quinn MD  Last diagnosis associated with med order: 1. Acquired hypothyroidism  - levothyroxine (SYNTHROID, LEVOTHROID) 100 MCG tablet [Pharmacy Med Name: LEVOTHYROXINE 0.100MG (100MCG) TAB]; TAKE 1 TABLET BY MOUTH EVERY DAY  Dispense: 90 tablet; Refill: 3    2. Mixed hyperlipidemia  - atorvastatin (LIPITOR) 40 MG tablet [Pharmacy Med Name: ATORVASTATIN 40MG TABLETS]; TAKE 1 TABLET BY MOUTH DAILY  Dispense: 90 tablet; Refill: 3    3. Essential hypertension  - lisinopriL (PRINIVIL,ZESTRIL) 20 MG tablet [Pharmacy Med Name: LISINOPRIL 20MG TABLETS]; TAKE 1 TABLET BY MOUTH DAILY  Dispense: 90 tablet; Refill: 3  - atenoloL (TENORMIN) 100 MG tablet [Pharmacy Med Name: ATENOLOL 100MG TABLETS]; TAKE 1 TABLET BY MOUTH EVERY DAY  Dispense: 90 tablet; Refill: 3    4. Controlled type 2 diabetes mellitus without complication, without long-term current use of insulin (H)  - metFORMIN (GLUCOPHAGE-XR) 500 MG 24 hr tablet [Pharmacy Med Name: METFORMIN ER 500MG 24HR TABS]; TAKE 2 TABLETS BY  MOUTH TWICE DAILY  Dispense: 360 tablet; Refill: 3     If protocol passes may refill for 12 months if within 3 months of last provider visit (or a total of 15 months).           Failed - A1C in last 6 months     Hemoglobin A1c   Date Value Ref Range Status   11/21/2019 6.0 3.5 - 6.0 % Final               Failed - Microalbumin in last year      No results found for: MICROALBUR               Passed - Blood pressure in last 12 months     BP Readings from Last 1 Encounters:   11/21/19 138/82             Passed - LFT or AST or ALT in last 12 months     Albumin   Date Value Ref Range Status   11/21/2019 4.1 3.5 - 5.0 g/dL Final     Bilirubin, Total   Date Value Ref Range Status   11/21/2019 0.6 0.0 - 1.0 mg/dL Final     Alkaline Phosphatase   Date Value Ref Range Status   11/21/2019 92 45 - 120 U/L Final     AST   Date Value Ref Range Status   11/21/2019 25 0 - 40 U/L Final     ALT   Date Value Ref Range Status   11/21/2019 28 0 - 45 U/L Final     Protein, Total   Date Value Ref Range Status   11/21/2019 7.5 6.0 - 8.0 g/dL Final                Passed - GFR or Serum Creatinine in last 6 months     GFR MDRD Non Af Amer   Date Value Ref Range Status   11/21/2019 59 (L) >60 mL/min/1.73m2 Final     GFR MDRD Af Amer   Date Value Ref Range Status   11/21/2019 >60 >60 mL/min/1.73m2 Final                atenoloL (TENORMIN) 100 MG tablet [Pharmacy Med Name: ATENOLOL 100MG TABLETS] 90 tablet 3     Sig: TAKE 1 TABLET BY MOUTH EVERY DAY       Beta-Blockers Refill Protocol Passed - 11/16/2020  4:11 PM        Passed - PCP or prescribing provider visit in past 12 months or next 3 months     Last office visit with prescriber/PCP: Visit date not found OR same dept: Visit date not found OR same specialty: Visit date not found  Last physical: 11/21/2019 Last MTM visit: Visit date not found   Next visit within 3 mo: Visit date not found  Next physical within 3 mo: Visit date not found  Prescriber OR PCP: Ann Marie Quinn MD  Last diagnosis  associated with med order: 1. Acquired hypothyroidism  - levothyroxine (SYNTHROID, LEVOTHROID) 100 MCG tablet [Pharmacy Med Name: LEVOTHYROXINE 0.100MG (100MCG) TAB]; TAKE 1 TABLET BY MOUTH EVERY DAY  Dispense: 90 tablet; Refill: 3    2. Mixed hyperlipidemia  - atorvastatin (LIPITOR) 40 MG tablet [Pharmacy Med Name: ATORVASTATIN 40MG TABLETS]; TAKE 1 TABLET BY MOUTH DAILY  Dispense: 90 tablet; Refill: 3    3. Essential hypertension  - lisinopriL (PRINIVIL,ZESTRIL) 20 MG tablet [Pharmacy Med Name: LISINOPRIL 20MG TABLETS]; TAKE 1 TABLET BY MOUTH DAILY  Dispense: 90 tablet; Refill: 3  - atenoloL (TENORMIN) 100 MG tablet [Pharmacy Med Name: ATENOLOL 100MG TABLETS]; TAKE 1 TABLET BY MOUTH EVERY DAY  Dispense: 90 tablet; Refill: 3    4. Controlled type 2 diabetes mellitus without complication, without long-term current use of insulin (H)  - metFORMIN (GLUCOPHAGE-XR) 500 MG 24 hr tablet [Pharmacy Med Name: METFORMIN ER 500MG 24HR TABS]; TAKE 2 TABLETS BY MOUTH TWICE DAILY  Dispense: 360 tablet; Refill: 3    If protocol passes may refill for 12 months if within 3 months of last provider visit (or a total of 15 months).             Passed - Blood pressure filed in past 12 months     BP Readings from Last 1 Encounters:   11/21/19 138/82

## 2021-06-14 NOTE — PROGRESS NOTES
Assessment and Plan:     Problem List Items Addressed This Visit     ASCVD (arteriosclerotic cardiovascular disease)     Asymptomatic. Continue risk factor management.         Controlled type 2 diabetes mellitus without complication, without long-term current use of insulin (H)     A1C today.         Relevant Medications    blood glucose test (GLUCOSE BLOOD) strips    metFORMIN (GLUCOPHAGE-XR) 500 MG 24 hr tablet    Other Relevant Orders    Glycosylated Hemoglobin A1c (Completed)    Comprehensive Metabolic Panel (Completed)    Microalbumin, Random Urine (Completed)    Essential hypertension     Well controlled. Continue atenolol 100 mg daily, lisinopril 20 mg daily. CMP today.         Relevant Medications    atenoloL (TENORMIN) 100 MG tablet    lisinopriL (PRINIVIL,ZESTRIL) 20 MG tablet    Mixed hyperlipidemia     CMP today. Continue atorvastatin.         Relevant Medications    atorvastatin (LIPITOR) 40 MG tablet    Other Relevant Orders    Lipid Profile (Completed)    Comprehensive Metabolic Panel (Completed)    Acquired hypothyroidism    Relevant Medications    levothyroxine (SYNTHROID, LEVOTHROID) 100 MCG tablet    Other Relevant Orders    Thyroid Stimulating Hormone (TSH) (Completed)    Tubular adenoma of colon     Colonoscopy due 7/2021.         Vasomotor rhinitis    Relevant Medications    fluticasone propionate (FLONASE) 50 mcg/actuation nasal spray    Adhesive capsulitis of left shoulder     No history of traumatic injury. Treatment options discussed. Order placed for physical therapy. She was also given exercises to start at home. If not improving, ortho referral would be the next step.         Relevant Orders    Ambulatory referral to Adult PT- Internal    Gastroesophageal reflux disease without esophagitis     Well controlled. Continue omeprazole.         Relevant Medications    omeprazole (PRILOSEC OTC) 20 MG tablet    Chronic kidney disease, stage 3      Other Visit Diagnoses     Routine general  medical examination at a health care facility    -  Primary    Screening mammogram, encounter for        Relevant Orders    Mammo Screening Bilateral        Patient instructions:  1. No change in medications.  2. You should receive calls to schedule for mammogram and physical therapy.  3. You are due for a colonoscopy 7/2021. Please let me know when you would like me to place an order.   4. Heat followed by stretching exercises for the shoulder twice daily.    The patient's current medical problems were reviewed.    I have had an Advance Directives discussion with the patient.  The following health maintenance schedule was reviewed with the patient and provided in printed form in the after visit summary:   Health Maintenance   Topic Date Due     DEXA SCAN  1950     ZOSTER VACCINES (2 of 3) 07/17/2012     DIABETIC EYE EXAM  12/20/2019     MAMMOGRAM  02/13/2020     DIABETIC FOOT EXAM  11/21/2020     INFLUENZA VACCINE RULE BASED (1) 06/30/2021 (Originally 8/1/2020)     A1C  04/21/2021     COLORECTAL CANCER SCREENING  07/25/2021     MEDICARE ANNUAL WELLNESS VISIT  01/21/2022     BMP  01/21/2022     LIPID  01/21/2022     MICROALBUMIN  01/21/2022     FALL RISK ASSESSMENT  01/21/2022     ADVANCE CARE PLANNING  01/21/2026     TD 18+ HE  11/21/2029     HEPATITIS C SCREENING  Completed     Pneumococcal Vaccine: 65+ Years  Completed     Pneumococcal Vaccine: Pediatrics (0 to 5 Years) and At-Risk Patients (6 to 64 Years)  Aged Out        Subjective:   Chief Complaint: Prabha Cabral is an 70 y.o. female here for an Annual Wellness visit.     HPI:  The patient is also complaining of left shoulder pain. She was washing windows about two months ago and then pain started after that. She reports range of motion has been limited and she has trouble with certain movements. No traumatic injury. She is not taking any over the counter medications. Range of motion is becoming an increasing problem. No increase with exertion and  no shortness of breath.     The patient also needs a recheck of her diabetes. Fasting sugars have been about 90. She denies any problems with her medications.     The patient is having some chronic rhinitis. No congestion or cold symptoms.      Review of Systems:   Please see above.  The rest of the review of systems are negative for all systems.    Patient Care Team:  Ann Marie Quinn MD as PCP - General (Family Medicine)  Ann Marie Quinn MD as Assigned PCP     Patient Active Problem List   Diagnosis     ASCVD (arteriosclerotic cardiovascular disease)     History of gout     Controlled type 2 diabetes mellitus without complication, without long-term current use of insulin (H)     Esophageal stricture     Essential hypertension     Mixed hyperlipidemia     Acquired hypothyroidism     Rosacea, acne     Tubular adenoma of colon     Davila's neuroma     Vasomotor rhinitis     Adhesive capsulitis of left shoulder     Gastroesophageal reflux disease without esophagitis     Chronic kidney disease, stage 3     History reviewed. No pertinent past medical history.   Past Surgical History:   Procedure Laterality Date     CORONARY STENT PLACEMENT  1999     ESOPHAGEAL DILATION  x 3      Family History   Problem Relation Age of Onset     Stroke Father      Heart disease Father      No Medical Problems Sister      No Medical Problems Brother      No Medical Problems Brother      No Medical Problems Brother      Parkinsonism Brother      Gout Brother      No Medical Problems Sister      Lumbar disc disease Daughter       Social History     Socioeconomic History     Marital status:      Spouse name: Not on file     Number of children: Not on file     Years of education: Not on file     Highest education level: Not on file   Occupational History     Not on file   Social Needs     Financial resource strain: Not on file     Food insecurity     Worry: Not on file     Inability: Not on file     Transportation needs      Medical: Not on file     Non-medical: Not on file   Tobacco Use     Smoking status: Former Smoker     Quit date: 1999     Years since quittin.0     Smokeless tobacco: Never Used   Substance and Sexual Activity     Alcohol use: Yes     Alcohol/week: 0.0 - 1.0 standard drinks     Comment: about twice per year     Drug use: Never     Sexual activity: Not on file   Lifestyle     Physical activity     Days per week: Not on file     Minutes per session: Not on file     Stress: Not on file   Relationships     Social connections     Talks on phone: Not on file     Gets together: Not on file     Attends Denominational service: Not on file     Active member of club or organization: Not on file     Attends meetings of clubs or organizations: Not on file     Relationship status: Not on file     Intimate partner violence     Fear of current or ex partner: Not on file     Emotionally abused: Not on file     Physically abused: Not on file     Forced sexual activity: Not on file   Other Topics Concern     Not on file   Social History Narrative     Not on file      Current Outpatient Medications   Medication Sig Dispense Refill     aspirin 325 MG tablet Take 325 mg by mouth.       atenoloL (TENORMIN) 100 MG tablet Take 1 tablet (100 mg total) by mouth daily. 90 tablet 3     atorvastatin (LIPITOR) 40 MG tablet Take 1 tablet (40 mg total) by mouth daily. 90 tablet 3     blood glucose test (GLUCOSE BLOOD) strips Use 1 each As Directed 2 (two) times a day. Dispense brand per patient's insurance at pharmacy discretion. 200 strip 11     generic lancets Use  to test daily. Use as directed. Pharmacy dispense brand based on insurance.       indomethacin (INDOCIN) 25 MG capsule Take 25 mg by mouth as needed.        levothyroxine (SYNTHROID, LEVOTHROID) 100 MCG tablet Take 1 tablet (100 mcg total) by mouth daily. 90 tablet 3     lisinopriL (PRINIVIL,ZESTRIL) 20 MG tablet Take 1 tablet (20 mg total) by mouth daily. 90 tablet 3     metFORMIN  "(GLUCOPHAGE-XR) 500 MG 24 hr tablet Take 2 tablets (1,000 mg total) by mouth 2 (two) times a day. 360 tablet 3     omeprazole (PRILOSEC OTC) 20 MG tablet Take 1 tablet (20 mg total) by mouth as needed. 90 tablet 3     fluticasone propionate (FLONASE) 50 mcg/actuation nasal spray 1 spray into each nostril daily. 16 g 12     No current facility-administered medications for this visit.       Objective:   Vital Signs:   Visit Vitals  /76 (Patient Site: Left Arm, Patient Position: Sitting, Cuff Size: Adult Large)   Pulse 64   Temp 97.9  F (36.6  C) (Oral)   Resp 16   Ht 5' 5.3\" (1.659 m)   Wt 199 lb 3 oz (90.4 kg)   BMI 32.84 kg/m           VisionScreening:  No exam data present     PHYSICAL EXAM  Physical Exam  Constitutional:       General: She is not in acute distress.  HENT:      Right Ear: Tympanic membrane and ear canal normal.      Left Ear: Tympanic membrane and ear canal normal.   Eyes:      Conjunctiva/sclera: Conjunctivae normal.      Pupils: Pupils are equal, round, and reactive to light.   Neck:      Musculoskeletal: Normal range of motion and neck supple.      Thyroid: No thyromegaly.      Vascular: No carotid bruit.   Cardiovascular:      Rate and Rhythm: Normal rate and regular rhythm.      Heart sounds: Normal heart sounds. No murmur.   Pulmonary:      Effort: Pulmonary effort is normal.      Breath sounds: Normal breath sounds. No wheezing or rales.   Chest:      Breasts:         Right: No inverted nipple, mass or skin change.         Left: No inverted nipple, mass or skin change.   Abdominal:      General: Bowel sounds are normal. There is no distension.      Palpations: Abdomen is soft. There is no mass.      Tenderness: There is no abdominal tenderness. There is no guarding or rebound.      Hernia: There is no hernia in the ventral area.   Musculoskeletal:         General: No deformity.      Right shoulder: She exhibits normal range of motion, no tenderness and no bony tenderness.      Left " shoulder: She exhibits decreased range of motion (unable to abduct past 80 degrees) and tenderness. She exhibits no bony tenderness and no deformity.   Lymphadenopathy:      Cervical: No cervical adenopathy.   Skin:     Findings: No rash.   Neurological:      Mental Status: She is alert and oriented to person, place, and time.      Cranial Nerves: No cranial nerve deficit.      Motor: No tremor or abnormal muscle tone.      Gait: Gait normal.      Deep Tendon Reflexes:      Reflex Scores:       Bicep reflexes are 2+ on the right side and 2+ on the left side.       Patellar reflexes are 2+ on the right side and 2+ on the left side.     Comments: Strength normal in hands bilaterally.   Psychiatric:         Behavior: Behavior normal.         Thought Content: Thought content normal.         Judgment: Judgment normal.          Assessment Results 1/21/2021   Activities of Daily Living No help needed   Instrumental Activities of Daily Living No help needed   Mini Cog Total Score 4   Some recent data might be hidden     A Mini-Cog score of 0-2 suggests the possibility of dementia, score of 3-5 suggests no dementia    Identified Health Risks:     The patient was provided with written information regarding signs of hearing loss.

## 2021-06-16 PROBLEM — E11.9 CONTROLLED TYPE 2 DIABETES MELLITUS WITHOUT COMPLICATION, WITHOUT LONG-TERM CURRENT USE OF INSULIN (H): Status: ACTIVE | Noted: 2017-10-24

## 2021-06-16 PROBLEM — Z87.39 HISTORY OF GOUT: Status: ACTIVE | Noted: 2018-11-12

## 2021-06-16 PROBLEM — K21.9 GASTROESOPHAGEAL REFLUX DISEASE WITHOUT ESOPHAGITIS: Status: ACTIVE | Noted: 2021-01-24

## 2021-06-16 PROBLEM — G57.60 MORTON'S NEUROMA: Status: ACTIVE | Noted: 2019-11-22

## 2021-06-16 PROBLEM — L71.9 ROSACEA, ACNE: Status: ACTIVE | Noted: 2019-11-21

## 2021-06-16 PROBLEM — D12.6 TUBULAR ADENOMA OF COLON: Status: ACTIVE | Noted: 2019-11-21

## 2021-06-16 PROBLEM — M75.02 ADHESIVE CAPSULITIS OF LEFT SHOULDER: Status: ACTIVE | Noted: 2021-01-21

## 2021-06-16 PROBLEM — J30.0 VASOMOTOR RHINITIS: Status: ACTIVE | Noted: 2021-01-21

## 2021-06-16 PROBLEM — N18.30 CHRONIC KIDNEY DISEASE, STAGE 3 (H): Status: ACTIVE | Noted: 2021-01-24

## 2021-06-16 PROBLEM — I10 ESSENTIAL HYPERTENSION: Status: ACTIVE | Noted: 2019-11-21

## 2021-06-16 PROBLEM — E78.2 MIXED HYPERLIPIDEMIA: Status: ACTIVE | Noted: 2019-11-21

## 2021-06-16 PROBLEM — K22.2 ESOPHAGEAL STRICTURE: Status: ACTIVE | Noted: 2019-11-21

## 2021-06-19 NOTE — LETTER
Letter by Ann Marie Quinn MD at      Author: Ann Marie Quinn MD Service: -- Author Type: --    Filed:  Encounter Date: 11/22/2019 Status: Signed         Prabha Cabral  [id number] [facility] [address]             November 22, 2019         Dear Ms. Cabral,    Below are the results from your recent visit:    Resulted Orders   Lipid Profile   Result Value Ref Range    Triglycerides 162 (H) <=149 mg/dL    Cholesterol 139 <=199 mg/dL    LDL Calculated 63 <=129 mg/dL    HDL Cholesterol 44 (L) >=50 mg/dL    Patient Fasting > 8hrs? Yes    Glycosylated Hemoglobin A1c   Result Value Ref Range    Hemoglobin A1c 6.0 3.5 - 6.0 %   Comprehensive Metabolic Panel   Result Value Ref Range    Sodium 142 136 - 145 mmol/L    Potassium 5.1 (H) 3.5 - 5.0 mmol/L    Chloride 107 98 - 107 mmol/L    CO2 27 22 - 31 mmol/L    Anion Gap, Calculation 8 5 - 18 mmol/L    Glucose 116 70 - 125 mg/dL    BUN 7 (L) 8 - 28 mg/dL    Creatinine 0.93 0.60 - 1.10 mg/dL    GFR MDRD Af Amer >60 >60 mL/min/1.73m2    GFR MDRD Non Af Amer 59 (L) >60 mL/min/1.73m2    Bilirubin, Total 0.6 0.0 - 1.0 mg/dL    Calcium 10.1 8.5 - 10.5 mg/dL    Protein, Total 7.5 6.0 - 8.0 g/dL    Albumin 4.1 3.5 - 5.0 g/dL    Alkaline Phosphatase 92 45 - 120 U/L    AST 25 0 - 40 U/L    ALT 28 0 - 45 U/L    Narrative    Fasting Glucose reference range is 70-99 mg/dL per  American Diabetes Association (ADA) guidelines.   Thyroid Stimulating Hormone (TSH)   Result Value Ref Range    TSH 2.75 0.30 - 5.00 uIU/mL   Hepatitis C Antibody (Anti-HCV)   Result Value Ref Range    Hepatitis C Ab Negative Negative       Your A1C is excellent at 6.0. Please continue your current diabetes medications. I would like to see your back in the clinic in 6 months.    The rest of your labs look great. Please continue all your current medications the same.    Please call with questions or contact us using [employer].    Sincerely,        Electronically signed by Ann Marie Quinn MD

## 2021-06-20 NOTE — LETTER
Letter by Ann Marie Quinn MD at      Author: Ann Marie Quinn MD Service: -- Author Type: --    Filed:  Encounter Date: 1/16/2020 Status: Signed         Prabha Cabral   [id number] [facility] [address]      1/16/2020       Dear Prabha,       In reviewing your records, we have determined a gap in your preventive services. Based on your age and health history, we recommend the follow service.     X     Diabetic Eye Exam- These are needed yearly from our diabetics. We ask that you inform your Eye doctor to please send their notes to us at the clinic.    If you have had the service elsewhere, please contact us so we can update our records. Please let us know if you have transferred your care to another clinic.    Please call [phone] to schedule this appointment.    We believe that a strong preventive care program, including regular physicals and follow-up care is an important part of a healthy lifestyle and we are committed to helping you maintain your health.    Thank you for choosing us as your health care provider.    Sincerely,     [facility]

## 2021-06-21 NOTE — LETTER
Letter by Ann Marie Quinn MD at      Author: Ann Marie Quinn MD Service: -- Author Type: --    Filed:  Encounter Date: 1/24/2021 Status: (Other)         Prabha Cabral  [id number] [facility] [address]             January 24, 2021         Dear Ms. Cabral,    Below are the results from your recent visit:    Resulted Orders   Lipid Profile   Result Value Ref Range    Triglycerides 143 <=149 mg/dL    Cholesterol 158 <=199 mg/dL    LDL Calculated 83 <=129 mg/dL    HDL Cholesterol 46 (L) >=50 mg/dL    Patient Fasting > 8hrs? Yes    Glycosylated Hemoglobin A1c   Result Value Ref Range    Hemoglobin A1c 6.1 (H) <=5.6 %   Comprehensive Metabolic Panel   Result Value Ref Range    Sodium 143 136 - 145 mmol/L    Potassium 4.1 3.5 - 5.0 mmol/L    Chloride 108 (H) 98 - 107 mmol/L    CO2 24 22 - 31 mmol/L    Anion Gap, Calculation 11 5 - 18 mmol/L    Glucose 109 70 - 125 mg/dL    BUN 8 8 - 28 mg/dL    Creatinine 1.03 0.60 - 1.10 mg/dL    GFR MDRD Af Amer >60 >60 mL/min/1.73m2    GFR MDRD Non Af Amer 53 (L) >60 mL/min/1.73m2    Bilirubin, Total 0.7 0.0 - 1.0 mg/dL    Calcium 9.5 8.5 - 10.5 mg/dL    Protein, Total 7.4 6.0 - 8.0 g/dL    Albumin 4.3 3.5 - 5.0 g/dL    Alkaline Phosphatase 83 45 - 120 U/L    AST 43 (H) 0 - 40 U/L    ALT 54 (H) 0 - 45 U/L    Narrative    Fasting Glucose reference range is 70-99 mg/dL per  American Diabetes Association (ADA) guidelines.   Thyroid Stimulating Hormone (TSH)   Result Value Ref Range    TSH 2.43 0.30 - 5.00 uIU/mL   Microalbumin, Random Urine   Result Value Ref Range    Microalbumin, Random Urine 3.69 (H) 0.00 - 1.99 mg/dL    Creatinine, Urine 93.9 mg/dL    Microalbumin/Creatinine Ratio Random Urine 39.3 (H) <=19.9 mg/g    Narrative    Microalbumin, Random Urine  <2.0 mg/dL . . . . . . . . Normal  3.0-30.0 mg/dL . . . . . . Microalbuminuria  >30.0 mg/dL . . . . . .  . Clinical Proteinuria    Microalbumin/Creatinine Ratio, Random Urine  <20 mg/g . . . . .. . . .  Normal   mg/g . . . . . . . Microalbuminuria  >300 mg/g . . . . . . . . Clinical Proteinuria           Your cholesterol levels are normal. Continue current medications.     Your A1C is well controlled at 6.1. Continue current management of diabetes.    Your blood work and urine show mild kidney disease, likely related to your diabetes. We should continue to control your blood sugars and blood pressure well to reduce progression. I would also recommend that you avoid ibuprofen, aleve, indomethacin, or other NSAIDs. Tylenol is OK to take.    Your thyroid is normal. Continue current doing.    Your liver enzymes are very slightly elevated. This is likely related to your medications but is not concerning. No changes at this time.      Please call with questions or contact us using [employer].    Sincerely,        Electronically signed by Ann Marie Quinn MD

## 2021-06-21 NOTE — LETTER
Letter by Ann Marie Quinn MD at      Author: Ann Marie Quinn MD Service: -- Author Type: --    Filed:  Encounter Date: 11/23/2020 Status: (Other)         Prabha,    You are overdue to be seen for recheck of diabetes, physical, and labs. Please contact our office to schedule.     Medications refilled for 3 months.  [phone]    Thanks you!  Latisha

## 2021-12-29 ENCOUNTER — TELEPHONE (OUTPATIENT)
Dept: FAMILY MEDICINE | Facility: CLINIC | Age: 71
End: 2021-12-29
Payer: MEDICARE

## 2021-12-29 DIAGNOSIS — M10.9 ACUTE GOUTY ARTHRITIS: ICD-10-CM

## 2021-12-29 DIAGNOSIS — Z87.39 HISTORY OF GOUT: Primary | ICD-10-CM

## 2021-12-29 RX ORDER — INDOMETHACIN 25 MG/1
CAPSULE ORAL
Qty: 15 CAPSULE | Refills: 0 | Status: SHIPPED | OUTPATIENT
Start: 2021-12-29 | End: 2022-02-08

## 2021-12-29 NOTE — TELEPHONE ENCOUNTER
Reason for Call:  Medication or medication refill:    Do you use a [facility]?  Name of the pharmacy and phone number for the current request:  [id number]    Name of the medication requested: Indomethacin 25 mg, last filled 6/30/2014    Other request: Patient has upcoming appointment 1/11/2022. Patient believes she has gout, describes left toe pain. Patient was offered appointment but refused.    Can we leave a detailed message on this number? YES    Phone number patient can be reached at: [phone]      Call taken on 12/29/2021 at 8:32 AM by Baribe Garcia

## 2022-01-12 ENCOUNTER — OFFICE VISIT (OUTPATIENT)
Dept: FAMILY MEDICINE | Facility: CLINIC | Age: 72
End: 2022-01-12
Payer: COMMERCIAL

## 2022-01-12 VITALS
HEIGHT: 65 IN | BODY MASS INDEX: 33.74 KG/M2 | WEIGHT: 202.5 LBS | SYSTOLIC BLOOD PRESSURE: 130 MMHG | DIASTOLIC BLOOD PRESSURE: 80 MMHG | OXYGEN SATURATION: 97 % | HEART RATE: 69 BPM | RESPIRATION RATE: 16 BRPM | TEMPERATURE: 98 F

## 2022-01-12 DIAGNOSIS — E78.2 MIXED HYPERLIPIDEMIA: ICD-10-CM

## 2022-01-12 DIAGNOSIS — N18.31 STAGE 3A CHRONIC KIDNEY DISEASE (H): ICD-10-CM

## 2022-01-12 DIAGNOSIS — H25.9 AGE-RELATED CATARACT OF BOTH EYES, UNSPECIFIED AGE-RELATED CATARACT TYPE: ICD-10-CM

## 2022-01-12 DIAGNOSIS — E11.9 CONTROLLED TYPE 2 DIABETES MELLITUS WITHOUT COMPLICATION, WITHOUT LONG-TERM CURRENT USE OF INSULIN (H): ICD-10-CM

## 2022-01-12 DIAGNOSIS — I25.10 ASCVD (ARTERIOSCLEROTIC CARDIOVASCULAR DISEASE): ICD-10-CM

## 2022-01-12 DIAGNOSIS — E03.9 ACQUIRED HYPOTHYROIDISM: ICD-10-CM

## 2022-01-12 DIAGNOSIS — Z01.818 PREOP GENERAL PHYSICAL EXAM: Primary | ICD-10-CM

## 2022-01-12 LAB
ALBUMIN SERPL-MCNC: 4.2 G/DL (ref 3.5–5)
ALP SERPL-CCNC: 75 U/L (ref 45–120)
ALT SERPL W P-5'-P-CCNC: 38 U/L (ref 0–45)
ANION GAP SERPL CALCULATED.3IONS-SCNC: 11 MMOL/L (ref 5–18)
AST SERPL W P-5'-P-CCNC: 35 U/L (ref 0–40)
BILIRUB SERPL-MCNC: 0.8 MG/DL (ref 0–1)
BUN SERPL-MCNC: 9 MG/DL (ref 8–28)
CALCIUM SERPL-MCNC: 10.2 MG/DL (ref 8.5–10.5)
CHLORIDE BLD-SCNC: 106 MMOL/L (ref 98–107)
CO2 SERPL-SCNC: 24 MMOL/L (ref 22–31)
CREAT SERPL-MCNC: 0.9 MG/DL (ref 0.6–1.1)
GFR SERPL CREATININE-BSD FRML MDRD: 67 ML/MIN/1.73M2
GLUCOSE BLD-MCNC: 93 MG/DL (ref 70–125)
HBA1C MFR BLD: 6 % (ref 0–5.6)
LDLC SERPL CALC-MCNC: 97 MG/DL
POTASSIUM BLD-SCNC: 4.4 MMOL/L (ref 3.5–5)
PROT SERPL-MCNC: 7.8 G/DL (ref 6–8)
SODIUM SERPL-SCNC: 141 MMOL/L (ref 136–145)
TSH SERPL DL<=0.005 MIU/L-ACNC: 8.6 UIU/ML (ref 0.3–5)

## 2022-01-12 PROCEDURE — 83036 HEMOGLOBIN GLYCOSYLATED A1C: CPT | Performed by: FAMILY MEDICINE

## 2022-01-12 PROCEDURE — 83721 ASSAY OF BLOOD LIPOPROTEIN: CPT | Performed by: FAMILY MEDICINE

## 2022-01-12 PROCEDURE — 84443 ASSAY THYROID STIM HORMONE: CPT | Performed by: FAMILY MEDICINE

## 2022-01-12 PROCEDURE — 80053 COMPREHEN METABOLIC PANEL: CPT | Performed by: FAMILY MEDICINE

## 2022-01-12 PROCEDURE — 36415 COLL VENOUS BLD VENIPUNCTURE: CPT | Performed by: FAMILY MEDICINE

## 2022-01-12 PROCEDURE — 99214 OFFICE O/P EST MOD 30 MIN: CPT | Performed by: FAMILY MEDICINE

## 2022-01-12 ASSESSMENT — ENCOUNTER SYMPTOMS
SHORTNESS OF BREATH: 0
SEIZURES: 0
EYE PAIN: 0
COUGH: 0
CHILLS: 0
ARTHRALGIAS: 0
COLOR CHANGE: 0
SORE THROAT: 0
PALPITATIONS: 0
FEVER: 0
ABDOMINAL PAIN: 0
BACK PAIN: 0
DYSURIA: 0
HEMATURIA: 0
VOMITING: 0

## 2022-01-12 ASSESSMENT — MIFFLIN-ST. JEOR: SCORE: 1429.17

## 2022-01-12 NOTE — PROGRESS NOTES
[facility]  [address]  [facility] [address]  Phone: [phone]  Fax: [phone]  Primary Provider: Ann Marie Quinn  Pre-op Performing Provider: MARLYS ARREOLA    PREOPERATIVE EVALUATION:  Today's date: 1/12/2022    Prabha Cabral is a 71 year old female who presents for a preoperative evaluation.    Surgical Information:  Surgery/Procedure: Cataract, Lt eye  Surgery Location: [facility]  Surgeon: Dr. Bryon Luke  Surgery Date: 1/17/22  Time of Surgery: 6:30 am  Where patient plans to recover: At home with family  Fax number for surgical facility: ph: [phone], F: [phone]    Type of Anesthesia Anticipated: Local with MAC    Age-related cataract of both eyes, unspecified age-related cataract type  Preop exam.  No contraindication to the proposed surgery.  Patient is able to perform greater than 4 metabolic equivalents.  She does have a distant history of heart disease but is asymptomatic at this time.  Medical treatment has been in place for a number of years.  Nonfasting lab work will be done in anticipation for annual exam in February.    Controlled type 2 diabetes mellitus without complication, without long-term current use of insulin (H)  History of stable hemoglobin A1c measurements.  She does check her blood sugar from time to time and they have been well within normal range.  She is due for A1c which will be drawn today.    Chronic kidney disease, stage 3  Stable.  Relatively new finding.  Check basic metabolic panel anticipation for her visit with her primary care provider next month.    Subjective     HPI related to upcoming procedure: recently in for yearly exam.  She wears reading glasses.  She was told that she cataracts.  She does not drive at night.  She struggles to see some lights.     History of gout.  Now recovered from recent flare.     DM: recent measurements range .     Preop Questions 1/12/2022   1. Have you ever had a  "heart attack or stroke? YES - stent placement in 1999   2. Have you ever had surgery on your heart or blood vessels, such as a stent placement, a coronary artery bypass, or surgery on an artery in your head, neck, heart, or legs? YES - stent placement in 1999   3. Do you have chest pain with activity? No   4. Do you have a history of  heart failure? No   5. Do you currently have a cold, bronchitis or symptoms of other infection? No   6. Do you have a cough, shortness of breath, or wheezing? No   7. Do you or anyone in your family have previous history of blood clots? No   8. Do you or does anyone in your family have a serious bleeding problem such as prolonged bleeding following surgeries or cuts? UNKNOWN - She says that she will have \"red spots\" if she hits her arm.  No problem with bleeding with surgeries.    9. Have you ever had problems with anemia or been told to take iron pills? No   10. Have you had any abnormal blood loss such as black, tarry or bloody stools, or abnormal vaginal bleeding? No   11. Have you ever had a blood transfusion? No   12. Are you willing to have a blood transfusion if it is medically needed before, during, or after your surgery? Yes   13. Have you or any of your relatives ever had problems with anesthesia? No   14. Do you have sleep apnea, excessive snoring or daytime drowsiness? No   15. Do you have any artifical heart valves or other implanted medical devices like a pacemaker, defibrillator, or continuous glucose monitor? No   16. Do you have artificial joints? No   17. Are you allergic to latex? No     Health Care Directive:  Patient does not have a Health Care Directive or Living Will: Patient states has Advance Directive and will bring in a copy to clinic.    Review of Systems   Constitutional: Negative for chills and fever.   HENT: Negative for ear pain and sore throat.    Eyes: Negative for pain and visual disturbance.   Respiratory: Negative for cough and shortness of breath.  "   Cardiovascular: Negative for chest pain and palpitations.   Gastrointestinal: Negative for abdominal pain and vomiting.   Genitourinary: Negative for dysuria and hematuria.   Musculoskeletal: Negative for arthralgias and back pain.   Skin: Negative for color change and rash.   Neurological: Negative for seizures and syncope.   All other systems reviewed and are negative.    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 Schatzki's ring, s/p dilation for dysphagia, 2016.   Twice previously         Essential hypertension 11/21/2019     Priority: Medium     Mixed hyperlipidemia 11/21/2019     Priority: Medium     Acquired hypothyroidism 11/21/2019     Priority: Medium     Burnt out Grave's         Rosacea, acne 11/21/2019     Priority: Medium     Tubular adenoma of colon 11/21/2019     Priority: Medium     History of gout 11/12/2018     Priority: Medium     Controlled type 2 diabetes mellitus without complication, without long-term current use of insulin (H) 10/24/2017     Priority: Medium     ASCVD (arteriosclerotic cardiovascular disease) 07/01/1999     Priority: Medium     MI 1999, Stent          No past medical history on file.  Past Surgical History:   Procedure Laterality Date     CORONARY STENT PLACEMENT  1999     DILATE ESOPHAGUS  x 3     Current Outpatient Medications   Medication Sig Dispense Refill     aspirin 325 MG tablet [ASPIRIN 325 MG TABLET] Take 325 mg by mouth.        atenoloL (TENORMIN) 100 MG tablet [ATENOLOL (TENORMIN) 100 MG TABLET] Take 1 tablet (100 mg total) by mouth daily. 90 tablet 3     atorvastatin (LIPITOR) 40 MG tablet [ATORVASTATIN (LIPITOR) 40 MG TABLET] TAKE 1 TABLET BY MOUTH DAILY 90 tablet 1     blood glucose test (GLUCOSE BLOOD) strips [BLOOD GLUCOSE TEST (GLUCOSE BLOOD) STRIPS] Use 1 each As Directed 2 (two) times a day. Dispense brand per patient's insurance at pharmacy discretion. 200 strip 11     fluticasone propionate (FLONASE) 50 mcg/actuation nasal spray [FLUTICASONE PROPIONATE (FLONASE) 50 MCG/ACTUATION NASAL SPRAY] 1 spray into each nostril daily. 16 g 12     generic lancets [GENERIC LANCETS] Use  to test daily. Use as directed. Pharmacy dispense brand based on insurance.       indomethacin (INDOCIN) 25 MG capsule Take 1 PO TID for 1-2 days, then BID for 1-2 days, then once daily for a total treatment of no more than 5 days. 15 capsule 0     levothyroxine (SYNTHROID, LEVOTHROID) 100 MCG tablet [LEVOTHYROXINE (SYNTHROID, LEVOTHROID) 100 MCG TABLET] Take 1 tablet (100 mcg total) by mouth daily. 90 tablet 3     lisinopriL (PRINIVIL,ZESTRIL) 20 MG tablet [LISINOPRIL (PRINIVIL,ZESTRIL) 20 MG TABLET] Take 1 tablet (20 mg total) by mouth daily. 90 tablet 3     metFORMIN (GLUCOPHAGE-XR) 500 MG 24 hr tablet [METFORMIN (GLUCOPHAGE-XR) 500 MG 24 HR TABLET] Take 2 tablets (1,000 mg total) by mouth 2 (two) times a day. 360 tablet 3     omeprazole (PRILOSEC OTC) 20 MG tablet [OMEPRAZOLE (PRILOSEC OTC) 20 MG TABLET] Take 1 tablet (20 mg total) by mouth as needed. 90 tablet 3       Allergies   Allergen Reactions     Sulfa (Sulfonamide Antibiotics) [Sulfa Drugs] Unknown        Social History     Tobacco Use     Smoking status: Former Smoker     Quit date: 1999     Years since quittin.0     Smokeless tobacco: Never Used   Substance Use Topics     Alcohol use: Yes     Alcohol/week: 0.0 - 1.0 standard drinks     Comment: Alcoholic Drinks/day: about twice per year  "    History   Drug Use Unknown         Objective     /80 (BP Location: Left arm, Patient Position: Sitting, Cuff Size: Adult Large)   Pulse 69   Temp 98  F (36.7  C) (Oral)   Resp 16   Ht 1.659 m (5' 5.3\")   Wt 91.9 kg (202 lb 8 oz)   SpO2 97%   BMI 33.39 kg/m      Physical Exam  Vitals reviewed.   Constitutional:       General: She is not in acute distress.     Appearance: Normal appearance. She is not ill-appearing.   HENT:      Head: Normocephalic and atraumatic.      Right Ear: External ear normal.      Left Ear: External ear normal.      Nose: Nose normal.      Mouth/Throat:      Pharynx: Oropharynx is clear. No oropharyngeal exudate or posterior oropharyngeal erythema.   Eyes:      General: No scleral icterus.        Right eye: No discharge.         Left eye: No discharge.      Extraocular Movements: Extraocular movements intact.      Conjunctiva/sclera: Conjunctivae normal.      Pupils: Pupils are equal, round, and reactive to light.   Neck:      Comments: No thyromegaly.  Cardiovascular:      Rate and Rhythm: Normal rate and regular rhythm.      Heart sounds: Normal heart sounds. No murmur heard.  No friction rub. No gallop.    Pulmonary:      Effort: Pulmonary effort is normal. No respiratory distress.      Breath sounds: Normal breath sounds. No wheezing or rales.   Abdominal:      General: There is no distension.      Palpations: Abdomen is soft. There is no mass.      Tenderness: There is no abdominal tenderness.   Musculoskeletal:         General: No signs of injury. Normal range of motion.      Cervical back: Normal range of motion.      Right lower leg: No edema.      Left lower leg: No edema.   Lymphadenopathy:      Cervical: No cervical adenopathy.   Skin:     General: Skin is warm.      Coloration: Skin is not jaundiced.      Findings: No rash.   Neurological:      General: No focal deficit present.      Mental Status: She is alert and oriented to person, place, and time.      Cranial " Nerves: No cranial nerve deficit.      Deep Tendon Reflexes: Reflexes normal.   Psychiatric:         Mood and Affect: Mood normal.       Diagnostics:  No results found for this or any previous visit (from the past 48 hour(s)).   No EKG required for low risk surgery (cataract, skin procedure, breast biopsy, etc).    Revised Cardiac Risk Index (RCRI):  The patient has the following serious cardiovascular risks for perioperative complications:   - Coronary Artery Disease (MI, positive stress test, angina, Qs on EKG) = 1 point     RCRI Interpretation: 1 point: Class II (low risk - 0.9% complication rate)           Signed Electronically by: Ashok Hua MD  Copy of this evaluation report is provided to requesting physician.

## 2022-01-12 NOTE — PATIENT INSTRUCTIONS
Preparing for Your Surgery  Getting started  A nurse will call you to review your health history and instructions. They will give you an arrival time based on your scheduled surgery time. Please be ready to share:    Your doctor's clinic name and phone number    Your medical, surgical and anesthesia history    A list of allergies and sensitivities    A list of medicines, including herbal treatments and over-the-counter drugs    Whether the patient has a legal guardian (ask how to send us the papers in advance)  Please tell us if you're pregnant--or if there's any chance you might be pregnant. Some surgeries may injure a fetus (unborn baby), so they require a pregnancy test. Surgeries that are safe for a fetus don't always need a test, and you can choose whether to have one.   If you have a child who's having surgery, please ask for a copy of Preparing for Your Child's Surgery.    Preparing for surgery    Within 30 days of surgery: Have a pre-op exam (sometimes called an H&P, or History and Physical). This can be done at a clinic or pre-operative center.  ? If you're having a , you may not need this exam. Talk to your care team.    At your pre-op exam, talk to your care team about all medicines you take. If you need to stop any medicines before surgery, ask when to start taking them again.  ? We do this for your safety. Many medicines can make you bleed too much during surgery. Some change how well surgery (anesthesia) drugs work.    Call your insurance company to let them know you're having surgery. (If you don't have insurance, call [phone].)    Call your clinic if there's any change in your health. This includes signs of a cold or flu (sore throat, runny nose, cough, rash, fever). It also includes a scrape or scratch near the surgery site.    If you have questions on the day of surgery, call your hospital or surgery center.  COVID testing  You may need to be tested for COVID-19 before having  surgery. If so, we will give you instructions.  Eating and drinking guidelines  For your safety: Unless your surgeon tells you otherwise, follow the guidelines below.    Eat and drink as usual until 8 hours before surgery. After that, no food or milk.    Drink clear liquids until 2 hours before surgery. These are liquids you can see through, like water, Gatorade and Propel Water. You may also have black coffee and tea (no cream or milk).    Nothing by mouth within 2 hours of surgery. This includes gum, candy and breath mints.    If you drink alcohol: Stop drinking it the night before surgery.    If your care team tells you to take medicine on the morning of surgery, it's okay to take it with a sip of water.  Preventing infection    Shower or bathe the night before and morning of your surgery. Follow the instructions your clinic gave you. (If no instructions, use regular soap.)    Don't shave or clip hair near your surgery site. We'll remove the hair if needed.    Don't smoke or vape the morning of surgery. You may chew nicotine gum up to 2 hours before surgery. A nicotine patch is okay.  ? Note: Some surgeries require you to completely quit smoking and nicotine. Check with your surgeon.    Your care team will make every effort to keep you safe from infection. We will:  ? Clean our hands often with soap and water (or an alcohol-based hand rub).  ? Clean the skin at your surgery site with a special soap that kills germs.  ? Give you a special gown to keep you warm. (Cold raises the risk of infection.)  ? Wear special hair covers, masks, gowns and gloves during surgery.  ? Give antibiotic medicine, if prescribed. Not all surgeries need antibiotics.  What to bring on the day of surgery    Photo ID and insurance card    Copy of your health care directive, if you have one    Glasses and hearing aides (bring cases)  ? You can't wear contacts during surgery    Inhaler and eye drops, if you use them (tell us about these when  you arrive)    CPAP machine or breathing device, if you use them    A few personal items, if spending the night    If you have . . .  ? A pacemaker, ICD (cardiac defibrillator) or other implant: Bring the ID card.  ? An implanted stimulator: Bring the remote control.  ? A legal guardian: Bring a copy of the certified (court-stamped) guardianship papers.  Please remove any jewelry, including body piercings. Leave jewelry and other valuables at home.  If you're going home the day of surgery    You must have a responsible adult drive you home. They should stay with you overnight as well.    If you don't have someone to stay with you, and you aren't safe to go home alone, we may keep you overnight. Insurance often won't pay for this.  After surgery  If it's hard to control your pain or you need more pain medicine, please call your surgeon's office.  Questions?   If you have any questions for your care team, list them here: _________________________________________________________________________________________________________________________________________________________________________ ____________________________________ ____________________________________ ____________________________________  For informational purposes only. Not to replace the advice of your health care provider. Copyright   2003, 2019 [facility]. All rights reserved. Clinically reviewed by Janelle Pan MD. [facility] [employer] [facility] [id number] - REV 07/21.

## 2022-01-12 NOTE — ASSESSMENT & PLAN NOTE
History of stable hemoglobin A1c measurements.  She does check her blood sugar from time to time and they have been well within normal range.  She is due for A1c which will be drawn today.

## 2022-01-12 NOTE — ASSESSMENT & PLAN NOTE
Preop exam.  No contraindication to the proposed surgery.  Patient is able to perform greater than 4 metabolic equivalents.  She does have a distant history of heart disease but is asymptomatic at this time.  Medical treatment has been in place for a number of years.  Nonfasting lab work will be done in anticipation for annual exam in February.

## 2022-01-12 NOTE — ASSESSMENT & PLAN NOTE
Stable.  Relatively new finding.  Check basic metabolic panel anticipation for her visit with her primary care provider next month.

## 2022-01-12 NOTE — LETTER
January 17, 2022      Prabha BARBOZA Misha  [address] [address]        Dear ,    We are writing to inform you of your test results.    Overall, your testing is stable except that your TSH is higher than it was previously.  I recommend that you review this result with Dr. Quinn during your visit in February.  She may adjust upward your thyroid replacement medication.    Resulted Orders   Hemoglobin A1c   Result Value Ref Range    Hemoglobin A1C 6.0 (H) 0.0 - 5.6 %      Comment:      Normal <5.7%   Prediabetes 5.7-6.4%    Diabetes 6.5% or higher     Note: Adopted from ADA consensus guidelines.   TSH   Result Value Ref Range    TSH 8.60 (H) 0.30 - 5.00 uIU/mL   LDL cholesterol direct   Result Value Ref Range    LDL Cholesterol Direct 97 <=129 mg/dL   Comprehensive metabolic panel   Result Value Ref Range    Sodium 141 136 - 145 mmol/L    Potassium 4.4 3.5 - 5.0 mmol/L    Chloride 106 98 - 107 mmol/L    Carbon Dioxide (CO2) 24 22 - 31 mmol/L    Anion Gap 11 5 - 18 mmol/L    Urea Nitrogen 9 8 - 28 mg/dL    Creatinine 0.90 0.60 - 1.10 mg/dL    Calcium 10.2 8.5 - 10.5 mg/dL    Glucose 93 70 - 125 mg/dL    Alkaline Phosphatase 75 45 - 120 U/L    AST 35 0 - 40 U/L    ALT 38 0 - 45 U/L    Protein Total 7.8 6.0 - 8.0 g/dL    Albumin 4.2 3.5 - 5.0 g/dL    Bilirubin Total 0.8 0.0 - 1.0 mg/dL    GFR Estimate 67 >60 mL/min/1.73m2      Comment:      Effective December 21, 2021 eGFRcr in adults is calculated using the 2021 CKD-EPI creatinine equation which includes age and gender (Walter et al., NEJM, DOI: 10.1056/GRGLsi0410390)       If you have any questions or concerns, please call the clinic at the number listed above.       Sincerely,      Ashok Hua MD

## 2022-01-19 ENCOUNTER — DOCUMENTATION ONLY (OUTPATIENT)
Dept: OTHER | Facility: CLINIC | Age: 72
End: 2022-01-19
Payer: COMMERCIAL

## 2022-01-30 ENCOUNTER — HEALTH MAINTENANCE LETTER (OUTPATIENT)
Age: 72
End: 2022-01-30

## 2022-01-31 ENCOUNTER — TRANSFERRED RECORDS (OUTPATIENT)
Dept: HEALTH INFORMATION MANAGEMENT | Facility: CLINIC | Age: 72
End: 2022-01-31
Payer: COMMERCIAL

## 2022-02-08 ENCOUNTER — OFFICE VISIT (OUTPATIENT)
Dept: FAMILY MEDICINE | Facility: CLINIC | Age: 72
End: 2022-02-08
Payer: COMMERCIAL

## 2022-02-08 VITALS
TEMPERATURE: 97.5 F | RESPIRATION RATE: 16 BRPM | DIASTOLIC BLOOD PRESSURE: 74 MMHG | WEIGHT: 198.25 LBS | BODY MASS INDEX: 31.86 KG/M2 | HEART RATE: 68 BPM | SYSTOLIC BLOOD PRESSURE: 122 MMHG | HEIGHT: 66 IN

## 2022-02-08 DIAGNOSIS — K21.9 GASTROESOPHAGEAL REFLUX DISEASE WITHOUT ESOPHAGITIS: ICD-10-CM

## 2022-02-08 DIAGNOSIS — D12.6 TUBULAR ADENOMA OF COLON: ICD-10-CM

## 2022-02-08 DIAGNOSIS — E11.9 CONTROLLED TYPE 2 DIABETES MELLITUS WITHOUT COMPLICATION, WITHOUT LONG-TERM CURRENT USE OF INSULIN (H): ICD-10-CM

## 2022-02-08 DIAGNOSIS — I10 ESSENTIAL HYPERTENSION: ICD-10-CM

## 2022-02-08 DIAGNOSIS — E03.9 ACQUIRED HYPOTHYROIDISM: ICD-10-CM

## 2022-02-08 DIAGNOSIS — M10.9 ACUTE GOUTY ARTHRITIS: ICD-10-CM

## 2022-02-08 DIAGNOSIS — E78.2 MIXED HYPERLIPIDEMIA: ICD-10-CM

## 2022-02-08 DIAGNOSIS — I25.10 ASCVD (ARTERIOSCLEROTIC CARDIOVASCULAR DISEASE): ICD-10-CM

## 2022-02-08 PROBLEM — N18.30 CHRONIC KIDNEY DISEASE, STAGE 3 (H): Status: RESOLVED | Noted: 2021-01-24 | Resolved: 2022-02-08

## 2022-02-08 PROCEDURE — 99397 PER PM REEVAL EST PAT 65+ YR: CPT | Performed by: FAMILY MEDICINE

## 2022-02-08 PROCEDURE — 91306 COVID-19,PF,MODERNA (18+ YRS BOOSTER .25ML): CPT | Performed by: FAMILY MEDICINE

## 2022-02-08 PROCEDURE — 0064A COVID-19,PF,MODERNA (18+ YRS BOOSTER .25ML): CPT | Performed by: FAMILY MEDICINE

## 2022-02-08 RX ORDER — OMEPRAZOLE 20 MG/1
20 TABLET, DELAYED RELEASE ORAL DAILY
Qty: 90 TABLET | Refills: 4 | Status: SHIPPED | OUTPATIENT
Start: 2022-02-08 | End: 2023-03-08

## 2022-02-08 RX ORDER — DICLOFENAC SODIUM 1 MG/ML
SOLUTION/ DROPS OPHTHALMIC
COMMUNITY
Start: 2022-01-24 | End: 2023-08-31

## 2022-02-08 RX ORDER — METFORMIN HCL 500 MG
1000 TABLET, EXTENDED RELEASE 24 HR ORAL 2 TIMES DAILY
Qty: 360 TABLET | Refills: 4 | Status: SHIPPED | OUTPATIENT
Start: 2022-02-08 | End: 2023-03-08

## 2022-02-08 RX ORDER — LEVOTHYROXINE SODIUM 112 UG/1
112 TABLET ORAL DAILY
Qty: 90 TABLET | Refills: 1 | Status: SHIPPED | OUTPATIENT
Start: 2022-02-08 | End: 2022-09-06

## 2022-02-08 RX ORDER — PREDNISOLONE ACETATE 10 MG/ML
SUSPENSION/ DROPS OPHTHALMIC
COMMUNITY
Start: 2022-01-26 | End: 2023-08-31

## 2022-02-08 RX ORDER — ATENOLOL 100 MG/1
100 TABLET ORAL DAILY
Qty: 90 TABLET | Refills: 4 | Status: SHIPPED | OUTPATIENT
Start: 2022-02-08 | End: 2023-03-08

## 2022-02-08 RX ORDER — MOXIFLOXACIN 5 MG/ML
SOLUTION/ DROPS OPHTHALMIC
COMMUNITY
Start: 2022-01-17 | End: 2023-08-31

## 2022-02-08 RX ORDER — INDOMETHACIN 25 MG/1
25 CAPSULE ORAL
Qty: 30 CAPSULE | Refills: 1 | Status: SHIPPED | OUTPATIENT
Start: 2022-02-08 | End: 2023-02-13

## 2022-02-08 RX ORDER — LISINOPRIL 20 MG/1
20 TABLET ORAL DAILY
Qty: 90 TABLET | Refills: 4 | Status: SHIPPED | OUTPATIENT
Start: 2022-02-08 | End: 2023-03-08

## 2022-02-08 RX ORDER — ATORVASTATIN CALCIUM 40 MG/1
40 TABLET, FILM COATED ORAL DAILY
Qty: 90 TABLET | Refills: 4 | Status: SHIPPED | OUTPATIENT
Start: 2022-02-08 | End: 2023-03-08

## 2022-02-08 ASSESSMENT — ACTIVITIES OF DAILY LIVING (ADL): CURRENT_FUNCTION: NO ASSISTANCE NEEDED

## 2022-02-08 ASSESSMENT — MIFFLIN-ST. JEOR: SCORE: 1413.07

## 2022-02-08 NOTE — ASSESSMENT & PLAN NOTE
Most recent TSH in January was elevated. Increase levothyroxine to 112 mcg daily. Recheck 6 weeks after dose change.

## 2022-02-08 NOTE — ASSESSMENT & PLAN NOTE
Colonoscopy due. Patient is refusing at this time. She will contact me if she would like me to place an order.

## 2022-02-08 NOTE — PATIENT INSTRUCTIONS
1. Increase levothyroxine to 112 mcg daily.  2. Return for recheck of your thyroid lab about 6 weeks after you change the dose. You can just make a lab appointment.  3. Continue your other medications at their current dosages.

## 2022-02-08 NOTE — PROGRESS NOTES
"SUBJECTIVE:   Prabha Cabral is a 71 year old female who presents for Preventive Visit.    The patient is also complaining of a flair of her gout in her left toe for about 2 weeks. Her last flair was a few years ago. She isn't taking anything for it. No known injury.    Patient has been advised of split billing requirements and indicates understanding: Yes  Are you in the first 12 months of your Medicare coverage?  No    Arthritis    Healthy Habits:     In general, how would you rate your overall health?  Good    Frequency of exercise:  4-5 days/week    Duration of exercise:  15-30 minutes    Do you usually eat at least 4 servings of fruit and vegetables a day, include whole grains    & fiber and avoid regularly eating high fat or \"junk\" foods?  Yes    Taking medications regularly:  Yes    Medication side effects:  Not applicable    Ability to successfully perform activities of daily living:  No assistance needed    Home Safety:  No safety concerns identified    Hearing Impairment:  Difficulty following a conversation in a noisy restaurant or crowded room and need to ask people to speak up or repeat themselves    In the past 6 months, have you been bothered by leaking of urine?  No    In general, how would you rate your overall mental or emotional health?  Good      PHQ-2 Total Score: 0    Do you feel safe in your environment? Yes    Have you ever done Advance Care Planning? (For example, a Health Directive, POLST, or a discussion with a medical provider or your loved ones about your wishes): Yes, advance care planning is on file.       Fall risk  Fallen 2 or more times in the past year?: No  Any fall with injury in the past year?: No    Cognitive Screening   1) Repeat 3 items (Leader, Season, Table)  PASS  2) Clock draw: NORMAL  3) 3 item recall: Recalls 3 objects  Results: 3 items recalled: COGNITIVE IMPAIRMENT LESS LIKELY    Mini-CogTM Copyright S David. Licensed by the author for use in [facility] " "Services; reprinted with permission (soob@[facility]). All rights reserved.      Do you have sleep apnea, excessive snoring or daytime drowsiness?: no    Reviewed and updated as needed this visit by clinical staff  Tobacco  Allergies  Meds  Problems  Med Hx  Surg Hx  Fam Hx         Reviewed and updated as needed this visit by Provider  Tobacco  Allergies  Meds  Problems  Med Hx  Surg Hx  Fam Hx        Social History     Tobacco Use     Smoking status: Former Smoker     Quit date: 1999     Years since quittin.1     Smokeless tobacco: Never Used   Substance Use Topics     Alcohol use: Yes     Alcohol/week: 0.0 - 1.0 standard drinks     Comment: Alcoholic Drinks/day: about twice per year         Alcohol Use 2022   Prescreen: >3 drinks/day or >7 drinks/week? No               Current providers sharing in care for this patient include:   Patient Care Team:  Ann Marie Quinn MD as PCP - General  Ann Marie Quinn MD as Assigned PCP    The following health maintenance items are reviewed in Epic and correct as of today:  Health Maintenance Due   Topic Date Due     ANNUAL REVIEW OF HM ORDERS  Never done     EYE EXAM  2019     MICROALBUMIN  2022     Review of System: Relevant items noted in HPI. ROS otherwise negative.     OBJECTIVE:   /74 (BP Location: Left arm, Patient Position: Sitting, Cuff Size: Adult Large)   Pulse 68   Temp 97.5  F (36.4  C) (Oral)   Resp 16   Ht 1.664 m (5' 5.5\")   Wt 89.9 kg (198 lb 4 oz)   BMI 32.49 kg/m   Estimated body mass index is 32.49 kg/m  as calculated from the following:    Height as of this encounter: 1.664 m (5' 5.5\").    Weight as of this encounter: 89.9 kg (198 lb 4 oz).  Physical Exam  Constitutional:       General: She is not in acute distress.     Appearance: She is well-developed.   HENT:      Right Ear: Tympanic membrane and external ear normal.      Left Ear: Tympanic membrane and external ear normal.      Nose: Nose normal. "   Eyes:      General:         Right eye: No discharge.         Left eye: No discharge.      Conjunctiva/sclera: Conjunctivae normal.      Pupils: Pupils are equal, round, and reactive to light.   Neck:      Thyroid: No thyromegaly.      Trachea: No tracheal deviation.   Cardiovascular:      Rate and Rhythm: Normal rate and regular rhythm.      Heart sounds: Normal heart sounds. No murmur heard.      Pulmonary:      Effort: Pulmonary effort is normal. No respiratory distress.      Breath sounds: Normal breath sounds. No wheezing or rales.   Chest:   Breasts:      Right: No mass, nipple discharge, tenderness or axillary adenopathy.      Left: No mass, nipple discharge, tenderness or axillary adenopathy.       Abdominal:      General: Bowel sounds are normal.      Palpations: Abdomen is soft. There is no mass.      Tenderness: There is no abdominal tenderness.   Musculoskeletal:      Cervical back: Neck supple.      Comments: Left great toe has moderate erythema, swelling, and warmth at MTP joint.   Lymphadenopathy:      Cervical: No cervical adenopathy.      Upper Body:      Right upper body: No axillary adenopathy.      Left upper body: No axillary adenopathy.   Skin:     General: Skin is warm.      Findings: No rash.   Neurological:      General: No focal deficit present.      Mental Status: She is alert and oriented to person, place, and time.      Cranial Nerves: No cranial nerve deficit.      Motor: No abnormal muscle tone.      Gait: Gait normal.      Deep Tendon Reflexes: Reflexes are normal and symmetric.   Psychiatric:         Mood and Affect: Mood normal.         Thought Content: Thought content normal.         Judgment: Judgment normal.       ASSESSMENT / PLAN:     Problem List Items Addressed This Visit        Medium    ASCVD (arteriosclerotic cardiovascular disease)     Asymptomatic. Continue risk factor management.          Controlled type 2 diabetes mellitus without complication, without long-term  "current use of insulin (H)     Well controlled. Continue metformin. Recheck in 3 months.         Relevant Medications    levothyroxine (SYNTHROID/LEVOTHROID) 112 MCG tablet    metFORMIN (GLUCOPHAGE-XR) 500 MG 24 hr tablet    Essential hypertension     Well controlled. Continue current medication.         Relevant Medications    atenolol (TENORMIN) 100 MG tablet    lisinopril (ZESTRIL) 20 MG tablet    Mixed hyperlipidemia     Recent labs show excellent control.         Relevant Medications    atorvastatin (LIPITOR) 40 MG tablet    Acquired hypothyroidism     Most recent TSH in January was elevated. Increase levothyroxine to 112 mcg daily. Recheck 6 weeks after dose change.         Relevant Medications    levothyroxine (SYNTHROID/LEVOTHROID) 112 MCG tablet    Other Relevant Orders    TSH    Tubular adenoma of colon     Colonoscopy due. Patient is refusing at this time. She will contact me if she would like me to place an order.         Gastroesophageal reflux disease without esophagitis     Well controlled.         Relevant Medications    omeprazole (PRILOSEC OTC) 20 MG EC tablet      Other Visit Diagnoses     Acute gouty arthritis        Indomethacin 25 mg tid x 10 days. Follow up if not improving. Patient did have recent normal renal function.    Relevant Medications    indomethacin (INDOCIN) 25 MG capsule           Patient has been advised of split billing requirements and indicates understanding: Yes    COUNSELING:  Reviewed preventive health counseling, as reflected in patient instructions    Estimated body mass index is 32.49 kg/m  as calculated from the following:    Height as of this encounter: 1.664 m (5' 5.5\").    Weight as of this encounter: 89.9 kg (198 lb 4 oz).      She reports that she quit smoking about 23 years ago. She has never used smokeless tobacco.      Appropriate preventive services were discussed with this patient, including applicable screening as appropriate for cardiovascular disease, " diabetes, osteopenia/osteoporosis, and glaucoma.  As appropriate for age/gender, discussed screening for colorectal cancer, prostate cancer, breast cancer, and cervical cancer. Checklist reviewing preventive services available has been given to the patient.    Reviewed patients plan of care and provided an AVS. The Basic Care Plan (routine screening as documented in Health Maintenance) for Prabha meets the Care Plan requirement. This Care Plan has been established and reviewed with the Patient.    Counseling Resources:  ATP IV Guidelines  Pooled Cohorts Equation Calculator  Breast Cancer Risk Calculator  Breast Cancer: Medication to Reduce Risk  FRAX Risk Assessment  ICSI Preventive Guidelines  Dietary Guidelines for Americans, 2010  USDA's MyPlate  ASA Prophylaxis  Lung CA Screening    DANNA KNAPP MD  [facility]

## 2022-02-09 ENCOUNTER — TELEPHONE (OUTPATIENT)
Dept: FAMILY MEDICINE | Facility: CLINIC | Age: 72
End: 2022-02-09
Payer: COMMERCIAL

## 2022-02-16 NOTE — TELEPHONE ENCOUNTER
Prior Authorization Approval    Authorization Effective Date: 1/10/2022  Authorization Expiration Date: 2/16/2023  Medication: indomethacin (INDOCIN) 25 MG-APPROVED  Approved Dose/Quantity:    Reference #:     Insurance Company: [employer] - Phone [phone] Fax [phone]  Expected CoPay:       CoPay Card Available:      Foundation Assistance Needed:    Which Pharmacy is filling the prescription (Not needed for infusion/clinic administered): [id number] [address] AT [facility] [address]  Pharmacy Notified: Yes  Patient Notified: Yes  **Instructed pharmacy to notify patient when script is ready to /ship.**

## 2022-02-16 NOTE — TELEPHONE ENCOUNTER
Central Prior Authorization Team   Phone: [phone]    PA Initiation    Medication: indomethacin (INDOCIN) 25 MG capsule  Insurance Company: [employer] - Phone [phone] Fax [phone]  Pharmacy Filling the Rx: [id number] #[id number] [place], [place] - [address] AT [facility] OF [address]  Filling Pharmacy Phone: [phone]  Filling Pharmacy Fax: [phone]  Start Date: 2/16/2022    MANUALLY FAXED FORM.

## 2022-05-22 ENCOUNTER — HEALTH MAINTENANCE LETTER (OUTPATIENT)
Age: 72
End: 2022-05-22

## 2022-09-02 ENCOUNTER — TELEPHONE (OUTPATIENT)
Dept: FAMILY MEDICINE | Facility: CLINIC | Age: 72
End: 2022-09-02

## 2022-09-02 NOTE — TELEPHONE ENCOUNTER
General Call    Contacts       Type Contact Phone/Fax    09/02/2022 01:33 PM CDT Phone (Outgoing) Prabha Cabral (Self) [phone] (H)    Left Message         Reason for Call:  Called patient refill request received. Patient has thyroid labs due and patient also has no primary provider  Please help patient schedule a lab-only visit to recheck thyroid labs before fill and help patient schedule an appointment to establish care with a new provider.    Date of last appointment with provider: Last saw previous provider Dr. Quinn on 02/09/22     Could we send this information to you in [place] [employer] or would you prefer to receive a phone call?:   Patient would prefer a phone call   Okay to leave a detailed message?: No

## 2022-09-06 ENCOUNTER — LAB (OUTPATIENT)
Dept: LAB | Facility: CLINIC | Age: 72
End: 2022-09-06
Payer: COMMERCIAL

## 2022-09-06 DIAGNOSIS — E03.9 ACQUIRED HYPOTHYROIDISM: ICD-10-CM

## 2022-09-06 LAB — TSH SERPL DL<=0.005 MIU/L-ACNC: 1.04 UIU/ML (ref 0.3–4.2)

## 2022-09-06 PROCEDURE — 36415 COLL VENOUS BLD VENIPUNCTURE: CPT

## 2022-09-06 PROCEDURE — 84443 ASSAY THYROID STIM HORMONE: CPT

## 2022-09-06 NOTE — TELEPHONE ENCOUNTER
Patient calling to clarify message received on 9/2 regarding need for labs.     Clarified with patient repeat TSH is needed per 2/8 OV notes following dose adjustment in levothyroxine. Instructed patient to make lab only appointment to have this drawn.

## 2022-09-06 NOTE — TELEPHONE ENCOUNTER
Patient returned call. Message relayed. Patient scheduled for lab visit 9/6/22. Patient will call back to schedule appt to establish care.

## 2022-09-06 NOTE — TELEPHONE ENCOUNTER
Medication Request  Medication name: levothyroxine (SYNTHROID/LEVOTHROID) 112 MCG tablet  Requested Pharmacy: [facility] [id number]  When was patient last seen for this?:  2/8/22  Patient offered appointment:  Yes, patient had lab appointment for TSH 9/6/22 and will call back to schedule appointment to establish care with new provider.  Okay to leave a detailed message: yes     Patient of Dr. Quinn, sent to Dr. Lea as covering provider.

## 2022-09-07 RX ORDER — LEVOTHYROXINE SODIUM 112 UG/1
112 TABLET ORAL DAILY
Qty: 90 TABLET | Refills: 0 | Status: SHIPPED | OUTPATIENT
Start: 2022-09-07 | End: 2022-12-07

## 2022-09-07 NOTE — TELEPHONE ENCOUNTER
Patient has been made aware est care is needed please advise on fill until she can decide on who to see.  tsh done yesterday

## 2022-09-07 NOTE — TELEPHONE ENCOUNTER
"Former patient of ??? & has not established care with another provider.  Please assign refill request to covering provider per clinic standard process.    Routing refill request to provider for review/approval because:  No PCP    Last Written Prescription Date:  2/8/22  Last Fill Quantity: 90,  # refills: 1   Last office visit provider:  2/8/22     Requested Prescriptions   Pending Prescriptions Disp Refills     levothyroxine (SYNTHROID/LEVOTHROID) 112 MCG tablet 30 tablet 0     Sig: Take 1 tablet (112 mcg) by mouth daily       Thyroid Protocol Passed - 9/7/2022  7:41 AM        Passed - Patient is 12 years or older        Passed - Recent (12 mo) or future (30 days) visit within the authorizing provider's specialty     Patient has had an office visit with the authorizing provider or a provider within the authorizing providers department within the previous 12 mos or has a future within next 30 days. See \"Patient Info\" tab in inbasket, or \"Choose Columns\" in Meds & Orders section of the refill encounter.              Passed - Medication is active on med list        Passed - Normal TSH on file in past 12 months     Recent Labs   Lab Test 09/06/22  1254   TSH 1.04              Passed - No active pregnancy on record     If patient is pregnant or has had a positive pregnancy test, please check TSH.          Passed - No positive pregnancy test in past 12 months     If patient is pregnant or has had a positive pregnancy test, please check TSH.               Jag Zarate RN 09/07/22 7:41 AM  "

## 2022-09-25 ENCOUNTER — HEALTH MAINTENANCE LETTER (OUTPATIENT)
Age: 72
End: 2022-09-25

## 2022-12-05 DIAGNOSIS — E03.9 ACQUIRED HYPOTHYROIDISM: ICD-10-CM

## 2022-12-07 RX ORDER — LEVOTHYROXINE SODIUM 112 UG/1
TABLET ORAL
Qty: 90 TABLET | Refills: 0 | Status: SHIPPED | OUTPATIENT
Start: 2022-12-07 | End: 2023-03-01

## 2022-12-07 NOTE — TELEPHONE ENCOUNTER
Previous Patient of Dr. Quinn   Patient scheduled for 02/13/2023 to [facility] with Chel Thayer NP  Requesting Medication Bridge, Please advise.

## 2022-12-07 NOTE — TELEPHONE ENCOUNTER
"Former patient of [place] & has not established care with another provider.  Please assign refill request to covering provider per clinic standard process.      Last Written Prescription Date:  9/7/22  Last Fill Quantity: 90,  # refills: 0   Last office visit provider:  2/8/22     Requested Prescriptions   Pending Prescriptions Disp Refills     levothyroxine (SYNTHROID/LEVOTHROID) 112 MCG tablet [Pharmacy Med Name: LEVOTHYROXINE 0.112MG (112MCG) TABS] 90 tablet 0     Sig: TAKE 1 TABLET(112 MCG) BY MOUTH DAILY       Thyroid Protocol Passed - 12/5/2022  9:32 PM        Passed - Patient is 12 years or older        Passed - Recent (12 mo) or future (30 days) visit within the authorizing provider's specialty     Patient has had an office visit with the authorizing provider or a provider within the authorizing providers department within the previous 12 mos or has a future within next 30 days. See \"Patient Info\" tab in inbasket, or \"Choose Columns\" in Meds & Orders section of the refill encounter.              Passed - Medication is active on med list        Passed - Normal TSH on file in past 12 months     Recent Labs   Lab Test 09/06/22  1254   TSH 1.04              Passed - No active pregnancy on record     If patient is pregnant or has had a positive pregnancy test, please check TSH.          Passed - No positive pregnancy test in past 12 months     If patient is pregnant or has had a positive pregnancy test, please check TSH.               Amber Kruger, RN 12/06/22 7:02 PM  "

## 2022-12-07 NOTE — TELEPHONE ENCOUNTER
Left message to call back for: Patient  Information to relay to patient: Was speaking with patient when call was disconnected. Attempted to call back but it went to voicemail. Patient needs to establish care with new provider.

## 2023-01-30 ENCOUNTER — HEALTH MAINTENANCE LETTER (OUTPATIENT)
Age: 73
End: 2023-01-30

## 2023-02-13 ENCOUNTER — OFFICE VISIT (OUTPATIENT)
Dept: FAMILY MEDICINE | Facility: CLINIC | Age: 73
End: 2023-02-13
Payer: COMMERCIAL

## 2023-02-13 VITALS
RESPIRATION RATE: 18 BRPM | DIASTOLIC BLOOD PRESSURE: 82 MMHG | TEMPERATURE: 97.6 F | WEIGHT: 192.9 LBS | HEIGHT: 66 IN | SYSTOLIC BLOOD PRESSURE: 142 MMHG | BODY MASS INDEX: 31 KG/M2 | OXYGEN SATURATION: 100 % | HEART RATE: 66 BPM

## 2023-02-13 DIAGNOSIS — K21.9 GASTROESOPHAGEAL REFLUX DISEASE WITHOUT ESOPHAGITIS: ICD-10-CM

## 2023-02-13 DIAGNOSIS — M79.672 PAIN OF LEFT HEEL: ICD-10-CM

## 2023-02-13 DIAGNOSIS — M1A.9XX0 CHRONIC GOUT INVOLVING TOE OF LEFT FOOT WITHOUT TOPHUS, UNSPECIFIED CAUSE: ICD-10-CM

## 2023-02-13 DIAGNOSIS — Z12.11 SCREENING FOR COLON CANCER: ICD-10-CM

## 2023-02-13 DIAGNOSIS — E03.9 ACQUIRED HYPOTHYROIDISM: ICD-10-CM

## 2023-02-13 DIAGNOSIS — Z00.00 ENCOUNTER FOR MEDICARE ANNUAL WELLNESS EXAM: Primary | ICD-10-CM

## 2023-02-13 DIAGNOSIS — I10 ESSENTIAL HYPERTENSION: ICD-10-CM

## 2023-02-13 DIAGNOSIS — I25.10 ASCVD (ARTERIOSCLEROTIC CARDIOVASCULAR DISEASE): ICD-10-CM

## 2023-02-13 DIAGNOSIS — E11.9 CONTROLLED TYPE 2 DIABETES MELLITUS WITHOUT COMPLICATION, WITHOUT LONG-TERM CURRENT USE OF INSULIN (H): ICD-10-CM

## 2023-02-13 DIAGNOSIS — M10.9 ACUTE GOUTY ARTHRITIS: ICD-10-CM

## 2023-02-13 DIAGNOSIS — E78.2 MIXED HYPERLIPIDEMIA: ICD-10-CM

## 2023-02-13 LAB
ALBUMIN SERPL BCG-MCNC: 4.3 G/DL (ref 3.5–5.2)
ALP SERPL-CCNC: 75 U/L (ref 35–104)
ALT SERPL W P-5'-P-CCNC: 28 U/L (ref 10–35)
ANION GAP SERPL CALCULATED.3IONS-SCNC: 12 MMOL/L (ref 7–15)
AST SERPL W P-5'-P-CCNC: 25 U/L (ref 10–35)
BILIRUB SERPL-MCNC: 0.4 MG/DL
BUN SERPL-MCNC: 6.9 MG/DL (ref 8–23)
CALCIUM SERPL-MCNC: 9.5 MG/DL (ref 8.8–10.2)
CHLORIDE SERPL-SCNC: 105 MMOL/L (ref 98–107)
CHOLEST SERPL-MCNC: 134 MG/DL
CREAT SERPL-MCNC: 0.87 MG/DL (ref 0.51–0.95)
CREAT UR-MCNC: 66.9 MG/DL
DEPRECATED HCO3 PLAS-SCNC: 26 MMOL/L (ref 22–29)
GFR SERPL CREATININE-BSD FRML MDRD: 70 ML/MIN/1.73M2
GLUCOSE SERPL-MCNC: 123 MG/DL (ref 70–99)
HBA1C MFR BLD: 6.1 % (ref 0–5.6)
HDLC SERPL-MCNC: 38 MG/DL
HOLD SPECIMEN: NORMAL
HOLD SPECIMEN: NORMAL
LDLC SERPL CALC-MCNC: 72 MG/DL
MICROALBUMIN UR-MCNC: <12 MG/L
MICROALBUMIN/CREAT UR: NORMAL MG/G{CREAT}
NONHDLC SERPL-MCNC: 96 MG/DL
POTASSIUM SERPL-SCNC: 4.6 MMOL/L (ref 3.4–5.3)
PROT SERPL-MCNC: 7.2 G/DL (ref 6.4–8.3)
SODIUM SERPL-SCNC: 143 MMOL/L (ref 136–145)
TRIGL SERPL-MCNC: 121 MG/DL
TSH SERPL DL<=0.005 MIU/L-ACNC: 4.56 UIU/ML (ref 0.3–4.2)

## 2023-02-13 PROCEDURE — 80061 LIPID PANEL: CPT

## 2023-02-13 PROCEDURE — 80053 COMPREHEN METABOLIC PANEL: CPT

## 2023-02-13 PROCEDURE — 83036 HEMOGLOBIN GLYCOSYLATED A1C: CPT

## 2023-02-13 PROCEDURE — G0439 PPPS, SUBSEQ VISIT: HCPCS

## 2023-02-13 PROCEDURE — 99207 PR FOOT EXAM NO CHARGE: CPT

## 2023-02-13 PROCEDURE — 90662 IIV NO PRSV INCREASED AG IM: CPT

## 2023-02-13 PROCEDURE — 82043 UR ALBUMIN QUANTITATIVE: CPT

## 2023-02-13 PROCEDURE — 99214 OFFICE O/P EST MOD 30 MIN: CPT | Mod: 25

## 2023-02-13 PROCEDURE — 36415 COLL VENOUS BLD VENIPUNCTURE: CPT

## 2023-02-13 PROCEDURE — 91313 COVID-19 VACCINE BIVALENT BOOSTER 18+ (MODERNA): CPT

## 2023-02-13 PROCEDURE — G0008 ADMIN INFLUENZA VIRUS VAC: HCPCS | Mod: 59

## 2023-02-13 PROCEDURE — 82570 ASSAY OF URINE CREATININE: CPT

## 2023-02-13 PROCEDURE — 84443 ASSAY THYROID STIM HORMONE: CPT

## 2023-02-13 PROCEDURE — 0134A COVID-19 VACCINE BIVALENT BOOSTER 18+ (MODERNA): CPT

## 2023-02-13 RX ORDER — INDOMETHACIN 25 MG/1
25 CAPSULE ORAL
Qty: 30 CAPSULE | Refills: 1 | Status: SHIPPED | OUTPATIENT
Start: 2023-02-13

## 2023-02-13 ASSESSMENT — ENCOUNTER SYMPTOMS
DYSURIA: 0
HEMATURIA: 0
ABDOMINAL PAIN: 0
SORE THROAT: 0
WEAKNESS: 0
PARESTHESIAS: 1
JOINT SWELLING: 1
EYE PAIN: 0
NAUSEA: 0
CONSTIPATION: 0
FREQUENCY: 0
COUGH: 0
FEVER: 0
PALPITATIONS: 0
BREAST MASS: 0
SHORTNESS OF BREATH: 0
MYALGIAS: 0
ARTHRALGIAS: 1
CHILLS: 0
DIARRHEA: 0
HEADACHES: 0
NERVOUS/ANXIOUS: 0
HEARTBURN: 0
HEMATOCHEZIA: 0
DIZZINESS: 0

## 2023-02-13 ASSESSMENT — ACTIVITIES OF DAILY LIVING (ADL): CURRENT_FUNCTION: NO ASSISTANCE NEEDED

## 2023-02-13 NOTE — PROGRESS NOTES
"  SUBJECTIVE:   Prabha Cabral is a 72 year old female who presents for Preventive Visit.    {Split Bill scripting  The purpose of this visit is to discuss your medical history and prevent health problems before you are sick. You may be responsible for a co-pay, coinsurance, or deductible if your visit today includes services such as checking on a sore throat, having an x-ray or lab test, or treating and evaluating a new or existing condition :108118}  {Patient advised of split billing (Optional):441905}  Are you in the first 12 months of your Medicare Part B coverage?  { :075732::\"No\"}    Physical Health:    In general, how would you rate your overall physical health? { :143804}    Outside of work, how many days during the week do you exercise? { :822662}    Outside of work, approximately how many minutes a day do you exercise?{ :428635}    If you drink alcohol do you typically have >3 drinks per day or >7 drinks per week? { :679570}    Do you usually eat at least 4 servings of fruit and vegetables a day, include whole grains & fiber and avoid regularly eating high fat or \"junk\" foods? { :122314::\"Yes\"}    Do you have any problems taking medications regularly?  { :391233::\"No\"}    Do you have any side effects from medications? { :378184}    Needs assistance for the following daily activities: { :335072}    Which of the following safety concerns are present in your home?  { :432282::\"none identified\"}     Hearing impairment: { :874188}    In the past 6 months, have you been bothered by leaking of urine? { :636986}    Mental Health:    In general, how would you rate your overall mental or emotional health? { :655439}  PHQ-2 Score:      Do you feel safe in your environment? { :787340}    Have you ever done Advance Care Planning? (For example, a Health Directive, POLST, or a discussion with a medical provider or your loved ones about your wishes): { :355795}    Additional concerns to address?  {If YES, notify " "patient they may be responsible for a copay, coinsurance or deductible if the visit today includes services such as checking on a sore throat, having an x-ray or lab test, or treating and evaluating a new or existing condition :314490::\"No\"}    Fall risk:  { :460976}  {If any of the above assessments are answered yes, consider ordering appropriate referrals (Optional):721673::\"click delete button to remove this line now\"}  Cognitive Screening: { :489312}    {Do you have sleep apnea, excessive snoring or daytime drowsiness? (Optional):580495}    {Outside tests to abstract? :902472}    {additional problems to add (Optional):523380}    Reviewed and updated as needed this visit by clinical staff                  Reviewed and updated as needed this visit by Provider                 Social History     Tobacco Use     Smoking status: Former     Types: Cigarettes     Quit date: 1999     Years since quittin.1     Smokeless tobacco: Never   Substance Use Topics     Alcohol use: Yes     Alcohol/week: 0.0 - 1.0 standard drinks     Comment: Alcoholic Drinks/day: about twice per year                           Current providers sharing in care for this patient include: {Rooming staff:  Please update Care Team in Rooming Activity, refresh this note and then delete this statement}  Patient Care Team:  Ashok Hua MD as Assigned PCP    The following health maintenance items are reviewed in Epic and correct as of today:  Health Maintenance   Topic Date Due     DEXA  Never done     ANNUAL REVIEW OF HM ORDERS  Never done     LUNG CANCER SCREENING  Never done     ZOSTER IMMUNIZATION (2 of 3) 2012     COLORECTAL CANCER SCREENING  2018     EYE EXAM  2019     LIPID  2022     MICROALBUMIN  2022     COVID-19 Vaccine (4 - Booster for Moderna series) 2022     A1C  2022     DIABETIC FOOT EXAM  2022     INFLUENZA VACCINE (1) 2022     PHQ-2 (once per calendar year)  2023 " "    BMP  2023     FALL RISK ASSESSMENT  2023     MAMMO SCREENING  2023     MEDICARE ANNUAL WELLNESS VISIT  2024     ADVANCE CARE PLANNING  2027     DTAP/TDAP/TD IMMUNIZATION (3 - Td or Tdap) 2029     HEPATITIS C SCREENING  Completed     Pneumococcal Vaccine: 65+ Years  Completed     IPV IMMUNIZATION  Aged Out     MENINGITIS IMMUNIZATION  Aged Out     {Chronicprobdata (Optional):474223}  {Decision Support (Optional):163296}    ROS:  {ROS COMP:279051}    OBJECTIVE:   There were no vitals taken for this visit. Estimated body mass index is 32.49 kg/m  as calculated from the following:    Height as of 22: 1.664 m (5' 5.5\").    Weight as of 22: 89.9 kg (198 lb 4 oz).  EXAM:   {Exam :775590}    {Diagnostic Test Results (Optional):960874::\"Diagnostic Test Results:\",\"Labs reviewed in Epic\"}    ASSESSMENT / PLAN:   {Diag Picklist:921085}    {Patient advised of split billing (Optional):190424}    COUNSELING:  {Medicare Counselin}    Estimated body mass index is 32.49 kg/m  as calculated from the following:    Height as of 22: 1.664 m (5' 5.5\").    Weight as of 22: 89.9 kg (198 lb 4 oz).    {Weight Management Plan (ACO) Complete if BMI is abnormal-  Ages 18-64  BMI >24.9.  Age 65+ with BMI <23 or >30 (Optional):194419}    She reports that she quit smoking about 24 years ago. She has never used smokeless tobacco.    Appropriate preventive services were discussed with this patient, including applicable screening as appropriate for cardiovascular disease, diabetes, osteopenia/osteoporosis, and glaucoma.  As appropriate for age/gender, discussed screening for colorectal cancer, prostate cancer, breast cancer, and cervical cancer. Checklist reviewing preventive services available has been given to the patient.    Reviewed patients plan of care and provided an AVS. The {CarePlan:263751} for Prabha meets the Care Plan requirement. This Care Plan has been established and " reviewed with the {PATIENT, FAMILY MEMBER, CAREGIVER:261834}.    Counseling Resources:  ATP IV Guidelines  Pooled Cohorts Equation Calculator  Breast Cancer Risk Calculator  BRCA-Related Cancer Risk Assessment: FHS-7 Tool  FRAX Risk Assessment  ICSI Preventive Guidelines  Dietary Guidelines for Americans, 2010  USDA's MyPlate  ASA Prophylaxis  Lung CA Screening    RAYNA Camejo CNP  [facility]

## 2023-02-13 NOTE — ASSESSMENT & PLAN NOTE
Doing well. Current medications include metformin 1000mg BID. Checks BG once weekly. Usually runs 75-95. No hyperglycemic or hypoglycemia symptoms. A1C is 6.1 today. Will recheck with lab only appointment in 6 months.

## 2023-02-13 NOTE — ASSESSMENT & PLAN NOTE
/82 today. Does not check blood pressure outside of clinic. Reports that she was having a decent amount of discomfort with the reading; will schedule a nurse only BP check for two weeks from now. If it remains elevated, will increase lisinopril 30mg as she is quite close to goal.

## 2023-02-13 NOTE — ASSESSMENT & PLAN NOTE
"Noticed a couple of months ago. Has since dissipated. Was a moderate pain, located to her heel with radiation up the back of her ankle. \"Black and blue\" sensation. Differentials considered include gout (known gout in left big toe) vs plantar fascitis. As it is no longer present, will not pursue further workup. She will follow up if she has recurrence of the pain.   "

## 2023-02-13 NOTE — PATIENT INSTRUCTIONS
Call [facility] to inquire about a colonoscopy. If you would like to have it done there, just get a fax number and let us know where I can send the order to.  Follow up A1C in 6 months. This will be a lab only appointment.  Follow up in one year for annual preventative exam.    Patient Education   Personalized Prevention Plan  You are due for the preventive services outlined below.  Your care team is available to assist you in scheduling these services.  If you have already completed any of these items, please share that information with your care team to update in your medical record.  Health Maintenance Due   Topic Date Due    Osteoporosis Screening  Never done    ANNUAL REVIEW OF HM ORDERS  Never done    LUNG CANCER SCREENING  Never done    Zoster (Shingles) Vaccine (2 of 3) 07/17/2012    Colorectal Cancer Screening  08/12/2018    Eye Exam  12/20/2019    Cholesterol Lab  01/21/2022    Kidney Microalbumin Urine Test  01/21/2022    COVID-19 Vaccine (4 - Booster for Moderna series) 04/05/2022    A1C Lab  04/12/2022    Diabetic Foot Exam  05/20/2022    Flu Vaccine (1) 09/01/2022    PHQ-2 (once per calendar year)  01/01/2023    Basic Metabolic Panel  01/12/2023    FALL RISK ASSESSMENT  02/08/2023

## 2023-02-13 NOTE — ASSESSMENT & PLAN NOTE
Was found to be subtherapeutic in her dosing at her last annual. Responded appropriately to dose change. Current medications include levothyroxine 112mcg. Reports she 'thinks it's gone bonkers.' Has noticed some worsening of her rosacea and brittle nails. Lab updated today.

## 2023-02-13 NOTE — ASSESSMENT & PLAN NOTE
Updated labs today. Current medications include atorvastatin 40mg daily. No adverse effects reported.

## 2023-02-13 NOTE — ASSESSMENT & PLAN NOTE
Noted last year. Responded well to indomethacin. She is requesting a refill for future flare ups. Updated CMP today.

## 2023-02-13 NOTE — PROGRESS NOTES
"SUBJECTIVE:   Prabha is a 72 year old who presents for Preventive Visit.  Patient has been advised of split billing requirements and indicates understanding: Yes  Are you in the first 12 months of your Medicare coverage?  No    Healthy Habits:     In general, how would you rate your overall health?  Good    Frequency of exercise:  4-5 days/week    Duration of exercise:  15-30 minutes    Do you usually eat at least 4 servings of fruit and vegetables a day, include whole grains    & fiber and avoid regularly eating high fat or \"junk\" foods?  Yes    Taking medications regularly:  Yes    Ability to successfully perform activities of daily living:  No assistance needed    Home Safety:  No safety concerns identified    Hearing Impairment:  Difficulty following a conversation in a noisy restaurant or crowded room, find that men's voices are easier to understand than woman's and difficulty understanding soft or whispered speech    In the past 6 months, have you been bothered by leaking of urine?  No    In general, how would you rate your overall mental or emotional health?  Good      PHQ-2 Total Score: 0    Additional concerns today:  No      Have you ever done Advance Care Planning? (For example, a Health Directive, POLST, or a discussion with a medical provider or your loved ones about your wishes): No, advance care planning information given to patient to review.  Patient declined advance care planning discussion at this time.       Fall risk  Fallen 2 or more times in the past year?: No  Any fall with injury in the past year?: No    Cognitive Screening   1) Repeat 3 items (Leader, Season, Table)    2) Clock draw: NORMAL  3) 3 item recall: Recalls 3 objects  Results: 3 items recalled: COGNITIVE IMPAIRMENT LESS LIKELY    Mini-CogTM Copyright AKILAH Hernandez. Licensed by the author for use in [facility] [place]; reprinted with permission (wolf@.[email or web address]). All rights reserved.      Do you have sleep apnea, excessive snoring or " daytime drowsiness?: no    Reviewed and updated as needed this visit by clinical staff   Tobacco  Allergies  Meds  Problems  Med Hx  Surg Hx  Fam Hx          Reviewed and updated as needed this visit by Provider   Tobacco  Allergies  Meds  Problems  Med Hx  Surg Hx  Fam Hx         Social History     Tobacco Use     Smoking status: Former     Types: Cigarettes     Quit date: 1999     Years since quittin.1     Smokeless tobacco: Never   Substance Use Topics     Alcohol use: Yes     Alcohol/week: 0.0 - 1.0 standard drinks     Comment: Alcoholic Drinks/day: about twice per year       Alcohol Use 2023   Prescreen: >3 drinks/day or >7 drinks/week? No           Diabetes Follow-up    How often are you checking your blood sugar? A few times a month  What time of day are you checking your blood sugars (select all that apply)?  Before meals  Have you had any blood sugars above 200?  No  Have you had any blood sugars below 70?  No    What symptoms do you notice when your blood sugar is low?  Not applicable    What concerns do you have today about your diabetes? None     Do you have any of these symptoms? (Select all that apply)  No numbness or tingling in feet.  No redness, sores or blisters on feet.  No complaints of excessive thirst.  No reports of blurry vision.  No significant changes to weight.    Have you had a diabetic eye exam in the last 12 months? No - has an upcoming appointment scheduled     Hyperlipidemia Follow-Up      Are you regularly taking any medication or supplement to lower your cholesterol?   Yes- atorvastatin    Are you having muscle aches or other side effects that you think could be caused by your cholesterol lowering medication?  No    Hypertension Follow-up      Do you check your blood pressure regularly outside of the clinic? No     Are you following a low salt diet? No    Are your blood pressures ever more than 140 on the top number (systolic) OR more   than 90 on the  bottom number (diastolic), for example 140/90? N/A - does not take    BP Readings from Last 2 Encounters:   02/13/23 (!) 142/82   02/08/22 122/74     Hemoglobin A1C (%)   Date Value   02/13/2023 6.1 (H)   01/12/2022 6.0 (H)     LDL Cholesterol Calculated (mg/dL)   Date Value   01/21/2021 83   11/21/2019 63     LDL Cholesterol Direct (mg/dL)   Date Value   01/12/2022 97       Current providers sharing in care for this patient include:   Patient Care Team:  Chel Thayer APRN CNP as PCP - General (Family Medicine)  Ashok Hua MD as Assigned PCP    The following health maintenance items are reviewed in Epic and correct as of today:  Health Maintenance   Topic Date Due     DEXA  Never done     ANNUAL REVIEW OF HM ORDERS  Never done     ZOSTER IMMUNIZATION (2 of 3) 07/17/2012     COLORECTAL CANCER SCREENING  08/12/2018     EYE EXAM  12/20/2019     LIPID  01/21/2022     MICROALBUMIN  01/21/2022     BMP  01/12/2023     A1C  05/13/2023     MAMMO SCREENING  07/24/2023     MEDICARE ANNUAL WELLNESS VISIT  02/13/2024     DIABETIC FOOT EXAM  02/13/2024     FALL RISK ASSESSMENT  02/13/2024     ADVANCE CARE PLANNING  02/13/2028     DTAP/TDAP/TD IMMUNIZATION (3 - Td or Tdap) 11/21/2029     HEPATITIS C SCREENING  Completed     PHQ-2 (once per calendar year)  Completed     INFLUENZA VACCINE  Completed     Pneumococcal Vaccine: 65+ Years  Completed     COVID-19 Vaccine  Completed     IPV IMMUNIZATION  Aged Out     MENINGITIS IMMUNIZATION  Aged Out     Review of Systems   Constitutional: Negative for chills and fever.   HENT: Positive for hearing loss. Negative for congestion, ear pain and sore throat.    Eyes: Negative for pain and visual disturbance.   Respiratory: Negative for cough and shortness of breath.    Cardiovascular: Negative for chest pain, palpitations and peripheral edema.   Gastrointestinal: Negative for abdominal pain, constipation, diarrhea, heartburn, hematochezia and nausea.   Breasts:  Negative for  "tenderness, breast mass and discharge.   Genitourinary: Negative for dysuria, frequency, genital sores, hematuria, pelvic pain, urgency, vaginal bleeding and vaginal discharge.   Musculoskeletal: Positive for arthralgias and joint swelling. Negative for myalgias.   Skin: Positive for rash.   Neurological: Positive for paresthesias. Negative for dizziness, weakness and headaches.   Psychiatric/Behavioral: Negative for mood changes. The patient is not nervous/anxious.      OBJECTIVE:   BP (!) 142/82 (BP Location: Left arm, Patient Position: Sitting, Cuff Size: Adult Large)   Pulse 66   Temp 97.6  F (36.4  C) (Oral)   Resp 18   Ht 1.664 m (5' 5.5\")   Wt 87.5 kg (192 lb 14.4 oz)   SpO2 100%   BMI 31.61 kg/m   Estimated body mass index is 31.61 kg/m  as calculated from the following:    Height as of this encounter: 1.664 m (5' 5.5\").    Weight as of this encounter: 87.5 kg (192 lb 14.4 oz).  Physical Exam  GENERAL: healthy, alert and no distress  EYES: Eyes grossly normal to inspection, PERRL and conjunctivae and sclerae normal  HENT: ear canals and TM's normal, nose and mouth without ulcers or lesions  NECK: no adenopathy, no asymmetry, masses, or scars and thyroid normal to palpation  RESP: lungs clear to auscultation - no rales, rhonchi or wheezes  CV: regular rate and rhythm, normal S1 S2, no S3 or S4, no murmur, click or rub, no peripheral edema and peripheral pulses strong  ABDOMEN: soft, nontender, no hepatosplenomegaly, no masses and bowel sounds normal  MS: no gross musculoskeletal defects noted, no edema  SKIN: no suspicious lesions or rashes  NEURO: Normal strength and tone, mentation intact and speech normal  PSYCH: mentation appears normal, affect normal/bright    Diagnostic Test Results:  Labs reviewed in Epic    Results for orders placed or performed in visit on 02/13/23 (from the past 24 hour(s))   Hemoglobin A1c   Result Value Ref Range    Hemoglobin A1C 6.1 (H) 0.0 - 5.6 %   Extra Tube    " "Narrative    The following orders were created for panel order Extra Tube.  Procedure                               Abnormality         Status                     ---------                               -----------         ------                     Extra Red Top Tube[[id number]]                               Final result               Extra Urine Collection[[id number]]                           Final result                 Please view results for these tests on the individual orders.   Extra Red Top Tube   Result Value Ref Range    Hold Specimen JIC    Extra Urine Collection   Result Value Ref Range    Hold Specimen JIC        ASSESSMENT / PLAN:     Problem List Items Addressed This Visit        Nervous and Auditory    Pain of left heel     Noticed a couple of months ago. Has since dissipated. Was a moderate pain, located to her heel with radiation up the back of her ankle. \"Black and blue\" sensation. Differentials considered include gout (known gout in left big toe) vs plantar fascitis. As it is no longer present, will not pursue further workup. She will follow up if she has recurrence of the pain.          Relevant Medications    indomethacin (INDOCIN) 25 MG capsule       Digestive    Gastroesophageal reflux disease without esophagitis     Well controlled. Medications include omeprazole 20mg.            Endocrine    Controlled type 2 diabetes mellitus without complication, without long-term current use of insulin (H)     Doing well. Current medications include metformin 1000mg BID. Checks BG once weekly. Usually runs 75-95. No hyperglycemic or hypoglycemia symptoms. A1C is 6.1 today. Will recheck with lab only appointment in 6 months.         Relevant Orders    Hemoglobin A1c (Completed)    Lipid Profile (Chol, Trig, HDL, LDL calc)    Albumin Random Urine Quantitative with Creat Ratio    FOOT EXAM (Completed)    Hemoglobin A1c FUTURE 6mo    Mixed hyperlipidemia     Updated labs today. Current medications include " atorvastatin 40mg daily. No adverse effects reported.         Relevant Orders    Lipid Profile (Chol, Trig, HDL, LDL calc)    Acquired hypothyroidism     Was found to be subtherapeutic in her dosing at her last annual. Responded appropriately to dose change. Current medications include levothyroxine 112mcg. Reports she 'thinks it's gone bonkers.' Has noticed some worsening of her rosacea and brittle nails. Lab updated today.          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 Responded well to indomethacin. She is requesting a refill for future flare ups. Updated CMP today.         Relevant Medications    indomethacin (INDOCIN) 25 MG capsule       Circulatory    ASCVD (arteriosclerotic cardiovascular disease)     Continues on aspirin daily. Asymptomatic.         Essential hypertension     /82 today. Does not check blood pressure outside of clinic. Reports that she was having a decent amount of discomfort with the reading; will schedule a nurse only BP check for two weeks from now. If it remains elevated, will increase lisinopril 30mg as she is quite close to goal.          Relevant Orders    Comprehensive metabolic panel (BMP + Alb, Alk Phos, ALT, AST, Total. Bili, TP)   Other Visit Diagnoses     Encounter for Medicare annual wellness exam    -  Primary    Acute gouty arthritis        Indomethacin 25 mg tid x 10 days. Follow up if not improving. Patient did have recent normal renal function.    Relevant Medications    indomethacin (INDOCIN) 25 MG capsule    Screening for colon cancer        Relevant Orders    Colonoscopy Screening  Referral          COUNSELING:  Reviewed preventive health counseling, as reflected in patient instructions       Regular exercise       Healthy diet/nutrition       Vision screening       Dental care       Fall risk prevention      BMI:   Estimated body mass index is 31.61 kg/m  as calculated from  "the following:    Height as of this encounter: 1.664 m (5' 5.5\").    Weight as of this encounter: 87.5 kg (192 lb 14.4 oz).   Weight management plan: Discussed healthy diet and exercise guidelines      She reports that she quit smoking about 24 years ago. She has never used smokeless tobacco.      Appropriate preventive services were discussed with this patient, including applicable screening as appropriate for cardiovascular disease, diabetes, osteopenia/osteoporosis, and glaucoma.  As appropriate for age/gender, discussed screening for colorectal cancer, prostate cancer, breast cancer, and cervical cancer. Checklist reviewing preventive services available has been given to the patient.    Reviewed patients plan of care and provided an AVS. The Basic Care Plan (routine screening as documented in Health Maintenance) for Prabha meets the Care Plan requirement. This Care Plan has been established and reviewed with the Patient.    RAYNA Camejo CNP  [facility]    Identified Health Risks:  "

## 2023-02-14 ENCOUNTER — TELEPHONE (OUTPATIENT)
Dept: FAMILY MEDICINE | Facility: CLINIC | Age: 73
End: 2023-02-14
Payer: COMMERCIAL

## 2023-02-14 NOTE — TELEPHONE ENCOUNTER
----- Message from RAYNA Camejo CNP sent at 2/14/2023  9:40 AM CST -----  Please call patient and let her know that her labs have resulted. Her kidney and liver function looked great. Cholesterol was normal with the exception of a low 'good' cholesterol level. This can be addressed by incorporating more healthy fats into the diet, such as the Mediterranean Diet. Her TSH was slightly elevated, but normal in terms of her age and goals of the lab with hypothyroidism. Her hemoglobin A1C was still in the prediabetic range but unchanged from last year. I placed an order for a lab only in 6 months to have this rechecked. Thank you!  RAYNA Camejo CNP on 2/14/2023 at 9:39 AM

## 2023-02-14 NOTE — TELEPHONE ENCOUNTER
Message relayed to patient.  Patient understands results and that provider placed order for labs to be redrawn in 6 months.

## 2023-02-27 ENCOUNTER — ALLIED HEALTH/NURSE VISIT (OUTPATIENT)
Dept: FAMILY MEDICINE | Facility: CLINIC | Age: 73
End: 2023-02-27
Payer: COMMERCIAL

## 2023-02-27 VITALS — DIASTOLIC BLOOD PRESSURE: 68 MMHG | OXYGEN SATURATION: 100 % | SYSTOLIC BLOOD PRESSURE: 130 MMHG | HEART RATE: 65 BPM

## 2023-02-27 DIAGNOSIS — I10 ESSENTIAL HYPERTENSION: Primary | ICD-10-CM

## 2023-02-27 PROCEDURE — 99207 PR NO CHARGE NURSE ONLY: CPT

## 2023-02-27 NOTE — PROGRESS NOTES
I met with Prabha Cabral at the request of RAYNA Domínguez CNP to recheck her blood pressure.  Blood pressure medications on the med list were reviewed with patient.    Patient has taken all medications as per usual regimen: Yes  Patient reports tolerating them without any issues or concerns: Yes    Vitals:    02/27/23 0947   BP: (!) 160/68   BP Location: Right arm   Patient Position: Sitting   Cuff Size: Adult Large   Pulse: 65   SpO2: 100%       After 5 minutes, the patient's blood pressure was <140/90, the previous encounter was reviewed, recorded blood pressure below 140/90. Patient was discharged and the note will be sent to the provider for final review.    Azam Ambrocio RN  [facility]

## 2023-02-28 ENCOUNTER — TRANSFERRED RECORDS (OUTPATIENT)
Dept: HEALTH INFORMATION MANAGEMENT | Facility: CLINIC | Age: 73
End: 2023-02-28
Payer: COMMERCIAL

## 2023-03-01 DIAGNOSIS — E11.9 CONTROLLED TYPE 2 DIABETES MELLITUS WITHOUT COMPLICATION, WITHOUT LONG-TERM CURRENT USE OF INSULIN (H): ICD-10-CM

## 2023-03-01 DIAGNOSIS — E03.9 ACQUIRED HYPOTHYROIDISM: ICD-10-CM

## 2023-03-01 DIAGNOSIS — E78.2 MIXED HYPERLIPIDEMIA: ICD-10-CM

## 2023-03-01 DIAGNOSIS — K21.9 GASTROESOPHAGEAL REFLUX DISEASE WITHOUT ESOPHAGITIS: ICD-10-CM

## 2023-03-01 DIAGNOSIS — I10 ESSENTIAL HYPERTENSION: ICD-10-CM

## 2023-03-01 RX ORDER — LISINOPRIL 20 MG/1
20 TABLET ORAL DAILY
Qty: 90 TABLET | Refills: 4 | OUTPATIENT
Start: 2023-03-01

## 2023-03-01 RX ORDER — METFORMIN HCL 500 MG
1000 TABLET, EXTENDED RELEASE 24 HR ORAL 2 TIMES DAILY
Qty: 360 TABLET | Refills: 4 | OUTPATIENT
Start: 2023-03-01

## 2023-03-01 RX ORDER — ATORVASTATIN CALCIUM 40 MG/1
40 TABLET, FILM COATED ORAL DAILY
Qty: 90 TABLET | Refills: 4 | OUTPATIENT
Start: 2023-03-01

## 2023-03-01 RX ORDER — OMEPRAZOLE 20 MG/1
20 TABLET, DELAYED RELEASE ORAL DAILY
Qty: 90 TABLET | Refills: 4 | OUTPATIENT
Start: 2023-03-01

## 2023-03-01 RX ORDER — ATENOLOL 100 MG/1
100 TABLET ORAL DAILY
Qty: 90 TABLET | Refills: 4 | OUTPATIENT
Start: 2023-03-01

## 2023-03-01 NOTE — TELEPHONE ENCOUNTER
Refused atenolol, metformin, lisinopril, omeprazole and atorvastatin because they all have refills on file.

## 2023-03-01 NOTE — TELEPHONE ENCOUNTER
"Routing refill request to provider for review/approval because:  Labs out of range:  TSH    Last Written Prescription Date:  12/7/22  Last Fill Quantity: 90,  # refills: 0   Last office visit provider:  2/13/23    Requested Prescriptions   Pending Prescriptions Disp Refills     levothyroxine (SYNTHROID/LEVOTHROID) 112 MCG tablet 90 tablet 0     Sig: Take 1 tablet (112 mcg) by mouth daily       Thyroid Protocol Failed - 3/1/2023  1:01 PM        Failed - Normal TSH on file in past 12 months     Recent Labs   Lab Test 02/13/23  0759   TSH 4.56*              Passed - Patient is 12 years or older        Passed - Recent (12 mo) or future (30 days) visit within the authorizing provider's specialty     Patient has had an office visit with the authorizing provider or a provider within the authorizing providers department within the previous 12 mos or has a future within next 30 days. See \"Patient Info\" tab in inbasket, or \"Choose Columns\" in Meds & Orders section of the refill encounter.              Passed - Medication is active on med list        Passed - No active pregnancy on record     If patient is pregnant or has had a positive pregnancy test, please check TSH.          Passed - No positive pregnancy test in past 12 months     If patient is pregnant or has had a positive pregnancy test, please check TSH.           Refused Prescriptions Disp Refills     atorvastatin (LIPITOR) 40 MG tablet 90 tablet 4     Sig: Take 1 tablet (40 mg) by mouth daily       Statins Protocol Passed - 3/1/2023  1:01 PM        Passed - LDL on file in past 12 months     Recent Labs   Lab Test 02/13/23  0759   LDL 72             Passed - No abnormal creatine kinase in past 12 months     No lab results found.             Passed - Recent (12 mo) or future (30 days) visit within the authorizing provider's specialty     Patient has had an office visit with the authorizing provider or a provider within the authorizing providers department within the " "previous 12 mos or has a future within next 30 days. See \"Patient Info\" tab in inbasket, or \"Choose Columns\" in Meds & Orders section of the refill encounter.              Passed - Medication is active on med list        Passed - Patient is age 18 or older        Passed - No active pregnancy on record        Passed - No positive pregnancy test in past 12 months           atenolol (TENORMIN) 100 MG tablet 90 tablet 4     Sig: Take 1 tablet (100 mg) by mouth daily       Beta-Blockers Protocol Passed - 3/1/2023  1:01 PM        Passed - Blood pressure under 140/90 in past 12 months     BP Readings from Last 3 Encounters:   02/27/23 130/68   02/13/23 (!) 142/82 02/08/22 122/74                 Passed - Patient is age 6 or older        Passed - Recent (12 mo) or future (30 days) visit within the authorizing provider's specialty     Patient has had an office visit with the authorizing provider or a provider within the authorizing providers department within the previous 12 mos or has a future within next 30 days. See \"Patient Info\" tab in inbasket, or \"Choose Columns\" in Meds & Orders section of the refill encounter.              Passed - Medication is active on med list           lisinopril (ZESTRIL) 20 MG tablet 90 tablet 4     Sig: Take 1 tablet (20 mg) by mouth daily       ACE Inhibitors (Including Combos) Protocol Passed - 3/1/2023  1:01 PM        Passed - Blood pressure under 140/90 in past 12 months     BP Readings from Last 3 Encounters:   02/27/23 130/68   02/13/23 (!) 142/82   02/08/22 122/74                 Passed - Recent (12 mo) or future (30 days) visit within the authorizing provider's specialty     Patient has had an office visit with the authorizing provider or a provider within the authorizing providers department within the previous 12 mos or has a future within next 30 days. See \"Patient Info\" tab in inbasket, or \"Choose Columns\" in Meds & Orders section of the refill encounter.              Passed - " "Medication is active on med list        Passed - Patient is age 18 or older        Passed - No active pregnancy on record        Passed - Normal serum creatinine on file in past 12 months     Recent Labs   Lab Test 02/13/23  0759   CR 0.87       Ok to refill medication if creatinine is low          Passed - Normal serum potassium on file in past 12 months     Recent Labs   Lab Test 02/13/23  0759   POTASSIUM 4.6             Passed - No positive pregnancy test within past 12 months           metFORMIN (GLUCOPHAGE XR) 500 MG 24 hr tablet 360 tablet 4     Sig: Take 2 tablets (1,000 mg) by mouth 2 times daily       Biguanide Agents Passed - 3/1/2023  1:01 PM        Passed - Patient is age 10 or older        Passed - Patient has documented A1c within the specified period of time.     If HgbA1C is 8 or greater, it needs to be on file within the past 3 months.  If less than 8, must be on file within the past 6 months.     Recent Labs   Lab Test 02/13/23  0759   A1C 6.1*             Passed - Patient's CR is NOT>1.4 OR Patient's EGFR is NOT<45 within past 12 mos.     Recent Labs   Lab Test 02/13/23  0759 01/12/22  1146 01/21/21  0912   GFRESTIMATED 70   < > 53*   GFRESTBLACK  --   --  >60    < > = values in this interval not displayed.       Recent Labs   Lab Test 02/13/23  0759   CR 0.87             Passed - Patient does NOT have a diagnosis of CHF.        Passed - Medication is active on med list        Passed - Patient is not pregnant        Passed - Patient has not had a positive pregnancy test within the past 12 mos.         Passed - Recent (6 mo) or future (30 days) visit within the authorizing provider's specialty     Patient had office visit in the last 6 months or has a visit in the next 30 days with authorizing provider or within the authorizing provider's specialty.  See \"Patient Info\" tab in inbasket, or \"Choose Columns\" in Meds & Orders section of the refill encounter.               omeprazole (PRILOSEC OTC) 20 " "MG EC tablet 90 tablet 4     Sig: Take 1 tablet (20 mg) by mouth daily       PPI Protocol Passed - 3/1/2023  1:01 PM        Passed - Not on Clopidogrel (unless Pantoprazole ordered)        Passed - No diagnosis of osteoporosis on record        Passed - Recent (12 mo) or future (30 days) visit within the authorizing provider's specialty     Patient has had an office visit with the authorizing provider or a provider within the authorizing providers department within the previous 12 mos or has a future within next 30 days. See \"Patient Info\" tab in inbasket, or \"Choose Columns\" in Meds & Orders section of the refill encounter.              Passed - Medication is active on med list        Passed - Patient is age 18 or older        Passed - No active pregnacy on record        Passed - No positive pregnancy test in past 12 months             Beti Pritchard RN 03/01/23 1:08 PM  "

## 2023-03-01 NOTE — TELEPHONE ENCOUNTER
Medication Request  Medication name:   omeprazole (PRILOSEC OTC) 20 MG EC tablet  metFORMIN (GLUCOPHAGE-XR) 500 MG 24 hr tablet  lisinopril (ZESTRIL) 20 MG tablet  levothyroxine (SYNTHROID/LEVOTHROID) 112 MCG tablet  atorvastatin (LIPITOR) 40 MG tablet  atenolol (TENORMIN) 100 MG tablet    Requested Pharmacy: [facility] #[id number]  When was patient last seen for this?:  02/13/23  Patient offered appointment:  No  Okay to leave a detailed message: yes    Patient states that she thought pcp was going to renew all of her medications during her annual visit on 02/13/23.  She has 2 days left of meds and is hoping these can be sent over asap.

## 2023-03-02 RX ORDER — LEVOTHYROXINE SODIUM 112 UG/1
112 TABLET ORAL DAILY
Qty: 90 TABLET | Refills: 0 | Status: SHIPPED | OUTPATIENT
Start: 2023-03-02 | End: 2023-07-06

## 2023-03-08 RX ORDER — OMEPRAZOLE 20 MG/1
20 TABLET, DELAYED RELEASE ORAL DAILY
Qty: 90 TABLET | Refills: 3 | Status: SHIPPED | OUTPATIENT
Start: 2023-03-08 | End: 2023-10-16

## 2023-03-08 RX ORDER — LISINOPRIL 20 MG/1
20 TABLET ORAL DAILY
Qty: 90 TABLET | Refills: 3 | Status: SHIPPED | OUTPATIENT
Start: 2023-03-08 | End: 2023-08-31

## 2023-03-08 RX ORDER — ATORVASTATIN CALCIUM 40 MG/1
40 TABLET, FILM COATED ORAL DAILY
Qty: 90 TABLET | Refills: 3 | Status: SHIPPED | OUTPATIENT
Start: 2023-03-08

## 2023-03-08 RX ORDER — ATENOLOL 100 MG/1
100 TABLET ORAL DAILY
Qty: 90 TABLET | Refills: 3 | Status: SHIPPED | OUTPATIENT
Start: 2023-03-08

## 2023-03-08 RX ORDER — METFORMIN HCL 500 MG
1000 TABLET, EXTENDED RELEASE 24 HR ORAL 2 TIMES DAILY
Qty: 360 TABLET | Refills: 3 | Status: SHIPPED | OUTPATIENT
Start: 2023-03-08

## 2023-03-08 NOTE — TELEPHONE ENCOUNTER
Patients pharmacy states they do NOT have any refills for these medications.    Patient states that she usually gets refills for 90-days supply for a year to last her until her next yearly AWV and is requesting those refills get added to these meds for her as well.    Patient is out of meds requesting these fills be expedited.

## 2023-03-08 NOTE — TELEPHONE ENCOUNTER
"Spoke with Prabha and relayed message from Chel Thayer NP.  \"Please call patient and let her know all of the refills have been sent over to pharmacy and let me know if she has any other issues.\"    "

## 2023-03-08 NOTE — TELEPHONE ENCOUNTER
Resending recent refills of medications from 2/8/2022 as patient is stating pharmacy stating they do not have them.   All medications requested filled 2/8/2022 dispense 90 day supply with 4 refills.    When attempting to resend under the same prescribing physician a message from the pharmacy stating Dr. Ann Marie Quinn is not set up as an e-prescribing provider for the pharmacy [facility] #[id number].    Routing to PCP to please sign off on these refills.     Eda Smith RN   03/08/23 11:32 AM  [facility] Nurse Advisor

## 2023-03-10 NOTE — TELEPHONE ENCOUNTER
Patients insurance will not cover the tablets and are requesting an RX for capsules instead.    omeprazole (PRILOSEC OTC) 20 MG EC tablet  Patient Sig: Take 1 tablet (20 mg) by mouth daily    Please advise.

## 2023-03-17 ENCOUNTER — TRANSFERRED RECORDS (OUTPATIENT)
Dept: HEALTH INFORMATION MANAGEMENT | Facility: CLINIC | Age: 73
End: 2023-03-17

## 2023-03-17 LAB — RETINOPATHY: POSITIVE

## 2023-04-18 ENCOUNTER — TELEPHONE (OUTPATIENT)
Dept: FAMILY MEDICINE | Facility: CLINIC | Age: 73
End: 2023-04-18
Payer: COMMERCIAL

## 2023-04-18 NOTE — TELEPHONE ENCOUNTER
Patient has been called about this she takes Take 1 capsule (25 mg) by mouth 3 times daily (with meals) Take 1 PO TID for 1-2 days, then BID for 1-2 days, then once daily for a total treatment of no more than 5 days    She takes for only 5 days and usually once or twice per year she has these issues.   She was given 30 pills today at the pharmacy and stated it will last her 2 years.   She is happy to take what ever you feel would be best for her

## 2023-04-18 NOTE — TELEPHONE ENCOUNTER
"  General Call      Reason for Call:  Med Change Request    [facility] #[id number]    Current Med: indomethacin (INDOCIN) 25 MG capsule  Patient Sig: Take 1 capsule (25 mg) by mouth 3 times daily (with meals) Take 1 PO TID for 1-2 days, then BID for 1-2 days, then once daily for a total treatment of no more than 5 days.    What are your questions or concerns:  Patient spoke to her insurance and they told her they provide better coverage and prefer either of these 2 meds below.  Please advise.    \"Meloxicam\"  \"Naproxen\"    Patient requests a call back with pcp's response, hoping this can be done asap today because she is out and due for a refill and doesn't want to buy the original RX if she's getting a cheaper one.    Please advise.    Date of last appointment with provider: 02/13/23    Could we send this information to you in [employer] or would you prefer to receive a phone call?:   Patient requests a phone call if this gets sent over or denied    Okay to leave a detailed message?: Yes at Cell number on file:    Telephone Information:   Mobile [phone]       "

## 2023-04-18 NOTE — TELEPHONE ENCOUNTER
Faxed letter received that insurance company has approved indomethacin for non-formulary coverage from 3/19/2023-4/17/2024. Letter sent to scanning.

## 2023-04-24 NOTE — TELEPHONE ENCOUNTER
".Spoke with Prabha,  She states that she has taken the Gout Meidcation for 5 days and she continues to have pain.  Prabha will let us know if the flare up continues.      Request from Chel Thayer NP:  \"Please call Prabha and apologize for the delay - if she is only taking the medication once or twice a year, we can continue on this. I just wanted to make sure she was not needing it more frequently than that. Thank you\"  "

## 2023-06-04 ENCOUNTER — HEALTH MAINTENANCE LETTER (OUTPATIENT)
Age: 73
End: 2023-06-04

## 2023-06-09 DIAGNOSIS — M10.9 ACUTE GOUTY ARTHRITIS: ICD-10-CM

## 2023-06-09 NOTE — TELEPHONE ENCOUNTER
Medication Request  Medication name: indomethacin (INDOCIN) 25 MG capsule  Requested Pharmacy: [id number]  When was patient last seen for this?:  02/13/23  Patient offered appointment:  [facility].  Okay to leave a detailed message: yes

## 2023-06-10 NOTE — TELEPHONE ENCOUNTER
"Routing refill request to provider for review/approval because:  Labs not current:  CBC, uric acid    Last Written Prescription Date:  2/13/2023  Last Fill Quantity: 30,  # refills: 1   Last office visit provider:  2/13/2023     Requested Prescriptions   Pending Prescriptions Disp Refills     indomethacin (INDOCIN) 25 MG capsule 30 capsule 1     Sig: Take 1 capsule (25 mg) by mouth 3 times daily (with meals) Take 1 PO TID for 1-2 days, then BID for 1-2 days, then once daily for a total treatment of no more than 5 days.       Gout Agents Protocol Failed - 6/9/2023 12:31 PM        Failed - CBC on file in past 12 months     No lab results found.              Failed - Has Uric Acid on file in past 12 months and value is less than 6     No lab results found.  If level is 6mg/dL or greater, ok to refill one time and refer to provider.           Passed - ALT on file in past 12 months     Recent Labs   Lab Test 02/13/23  0759   ALT 28             Passed - Recent (12 mo) or future (30 days) visit within the authorizing provider's specialty     Patient has had an office visit with the authorizing provider or a provider within the authorizing providers department within the previous 12 mos or has a future within next 30 days. See \"Patient Info\" tab in inbasket, or \"Choose Columns\" in Meds & Orders section of the refill encounter.              Passed - Medication is active on med list        Passed - Patient is age 18 or older        Passed - No active pregnancy on record        Passed - Normal serum creatinine on file in the past 12 months     Recent Labs   Lab Test 02/13/23  0759   CR 0.87       Ok to refill medication if creatinine is low          Passed - No positive pregnancy test in past year       NSAID Medications Failed - 6/9/2023 12:31 PM        Failed - Patient is age 6-64 years        Failed - Normal CBC on file in past 12 months     No lab results found.              Passed - Blood pressure under 140/90 in past 12 " "months     BP Readings from Last 3 Encounters:   02/27/23 130/68   02/13/23 (!) 142/82   02/08/22 122/74                 Passed - Normal ALT on file in past 12 months     Recent Labs   Lab Test 02/13/23  0759   ALT 28             Passed - Normal AST on file in past 12 months     Recent Labs   Lab Test 02/13/23  0759   AST 25             Passed - Recent (12 mo) or future (30 days) visit within the authorizing provider's specialty     Patient has had an office visit with the authorizing provider or a provider within the authorizing providers department within the previous 12 mos or has a future within next 30 days. See \"Patient Info\" tab in inbasket, or \"Choose Columns\" in Meds & Orders section of the refill encounter.              Passed - Medication is active on med list        Passed - No active pregnancy on record        Passed - Normal serum creatinine on file in past 12 months     Recent Labs   Lab Test 02/13/23  0759   CR 0.87       Ok to refill medication if creatinine is low          Passed - No positive pregnancy test in past 12 months             Lucy [facility] RN 06/10/23 12:03 AM  "

## 2023-06-12 NOTE — TELEPHONE ENCOUNTER
Left message to call back for: Charisse  Information to relay to patient: see below from Chel and ask    Another refill request has come through on this medication; patient previously reported that the last refill would last her through the year. Can we call and inquire on symptoms or if this refill request was an error?

## 2023-06-19 RX ORDER — INDOMETHACIN 25 MG/1
25 CAPSULE ORAL
Qty: 30 CAPSULE | Refills: 1 | OUTPATIENT
Start: 2023-06-19

## 2023-06-26 ENCOUNTER — PATIENT OUTREACH (OUTPATIENT)
Dept: CARE COORDINATION | Facility: CLINIC | Age: 73
End: 2023-06-26
Payer: COMMERCIAL

## 2023-07-06 DIAGNOSIS — E03.9 ACQUIRED HYPOTHYROIDISM: ICD-10-CM

## 2023-07-06 RX ORDER — LEVOTHYROXINE SODIUM 112 UG/1
112 TABLET ORAL DAILY
Qty: 90 TABLET | Refills: 1 | Status: SHIPPED | OUTPATIENT
Start: 2023-07-06 | End: 2024-01-02

## 2023-07-06 NOTE — TELEPHONE ENCOUNTER
Medication Request  Medication name: levothyroxine (SYNTHROID/LEVOTHROID) 112 MCG tablet  Requested Pharmacy: [facility] [id number]  When was patient last seen for this?:  02/13/23  Patient offered appointment:  [facility] pharmacy request.  Okay to leave a detailed message: yes

## 2023-07-06 NOTE — TELEPHONE ENCOUNTER
"Routing refill request to provider for review/approval because:  Labs out of range:  Last TSH 2/13/23 4.56    Last Written Prescription Date:  3/2/23  Last Fill Quantity: 90,  # refills: 0   Last office visit provider:  2/13/23     Requested Prescriptions   Pending Prescriptions Disp Refills     levothyroxine (SYNTHROID/LEVOTHROID) 112 MCG tablet 90 tablet 2     Sig: Take 1 tablet (112 mcg) by mouth daily       Thyroid Protocol Failed - 7/6/2023  8:03 AM        Failed - Normal TSH on file in past 12 months     Recent Labs   Lab Test 02/13/23  0759   TSH 4.56*              Passed - Patient is 12 years or older        Passed - Recent (12 mo) or future (30 days) visit within the authorizing provider's specialty     Patient has had an office visit with the authorizing provider or a provider within the authorizing providers department within the previous 12 mos or has a future within next 30 days. See \"Patient Info\" tab in inbasket, or \"Choose Columns\" in Meds & Orders section of the refill encounter.              Passed - Medication is active on med list        Passed - No active pregnancy on record     If patient is pregnant or has had a positive pregnancy test, please check TSH.          Passed - No positive pregnancy test in past 12 months     If patient is pregnant or has had a positive pregnancy test, please check TSH.               BETO CHA RN 07/06/23 11:55 AM  "

## 2023-08-31 ENCOUNTER — OFFICE VISIT (OUTPATIENT)
Dept: FAMILY MEDICINE | Facility: CLINIC | Age: 73
End: 2023-08-31
Payer: COMMERCIAL

## 2023-08-31 VITALS
SYSTOLIC BLOOD PRESSURE: 154 MMHG | HEART RATE: 59 BPM | RESPIRATION RATE: 16 BRPM | BODY MASS INDEX: 30.81 KG/M2 | DIASTOLIC BLOOD PRESSURE: 82 MMHG | HEIGHT: 66 IN | OXYGEN SATURATION: 99 % | WEIGHT: 191.7 LBS | TEMPERATURE: 98.1 F

## 2023-08-31 DIAGNOSIS — N30.01 ACUTE CYSTITIS WITH HEMATURIA: Primary | ICD-10-CM

## 2023-08-31 DIAGNOSIS — E11.9 CONTROLLED TYPE 2 DIABETES MELLITUS WITHOUT COMPLICATION, WITHOUT LONG-TERM CURRENT USE OF INSULIN (H): ICD-10-CM

## 2023-08-31 DIAGNOSIS — I10 ESSENTIAL HYPERTENSION: ICD-10-CM

## 2023-08-31 DIAGNOSIS — R35.0 URINARY FREQUENCY: ICD-10-CM

## 2023-08-31 LAB
ALBUMIN UR-MCNC: NEGATIVE MG/DL
APPEARANCE UR: ABNORMAL
BACTERIA #/AREA URNS HPF: ABNORMAL /HPF
BILIRUB UR QL STRIP: NEGATIVE
COLOR UR AUTO: YELLOW
GLUCOSE UR STRIP-MCNC: NEGATIVE MG/DL
HBA1C MFR BLD: 6.1 % (ref 0–5.6)
HGB UR QL STRIP: ABNORMAL
HOLD SPECIMEN: NORMAL
HOLD SPECIMEN: NORMAL
KETONES UR STRIP-MCNC: NEGATIVE MG/DL
LEUKOCYTE ESTERASE UR QL STRIP: ABNORMAL
NITRATE UR QL: NEGATIVE
PH UR STRIP: 5.5 [PH] (ref 5–8)
RBC #/AREA URNS AUTO: ABNORMAL /HPF
SP GR UR STRIP: <=1.005 (ref 1–1.03)
SQUAMOUS #/AREA URNS AUTO: ABNORMAL /LPF
UROBILINOGEN UR STRIP-ACNC: 0.2 E.U./DL
WBC #/AREA URNS AUTO: ABNORMAL /HPF
WBC CLUMPS #/AREA URNS HPF: PRESENT /HPF

## 2023-08-31 PROCEDURE — 36415 COLL VENOUS BLD VENIPUNCTURE: CPT

## 2023-08-31 PROCEDURE — 83036 HEMOGLOBIN GLYCOSYLATED A1C: CPT

## 2023-08-31 PROCEDURE — 87086 URINE CULTURE/COLONY COUNT: CPT

## 2023-08-31 PROCEDURE — 81001 URINALYSIS AUTO W/SCOPE: CPT

## 2023-08-31 PROCEDURE — 87186 SC STD MICRODIL/AGAR DIL: CPT

## 2023-08-31 PROCEDURE — 99214 OFFICE O/P EST MOD 30 MIN: CPT

## 2023-08-31 RX ORDER — NITROFURANTOIN 25; 75 MG/1; MG/1
100 CAPSULE ORAL 2 TIMES DAILY
Qty: 10 CAPSULE | Refills: 0 | Status: SHIPPED | OUTPATIENT
Start: 2023-08-31 | End: 2023-09-05

## 2023-08-31 RX ORDER — LISINOPRIL 40 MG/1
40 TABLET ORAL DAILY
Qty: 90 TABLET | Refills: 0 | Status: SHIPPED | OUTPATIENT
Start: 2023-08-31 | End: 2023-10-18

## 2023-08-31 ASSESSMENT — ENCOUNTER SYMPTOMS: DYSURIA: 1

## 2023-08-31 NOTE — ASSESSMENT & PLAN NOTE
UA today suggestive of acute infection.  No recent antibiotic use or history of recurrent infections.  Does have a sulfa allergy.  Will prescribe Macrobid twice daily x5 days.  Expected therapeutic effects and potential side effects discussed. Reviewed reasons to return to care, including fevers, flank pain, abdominal pain or any other symptoms suggestive of a pyelonephritis.

## 2023-08-31 NOTE — ASSESSMENT & PLAN NOTE
Uncontrolled with multiple clinic readings.  Somewhat unreliable, as patient reports significant discomfort with blood pressure checks, however due to the degree of elevation, I do think that we need to make adjustments to her medication.  We will increase her lisinopril to 40 mg daily and have her return in 3 to 4 weeks for blood pressure check and updated BMP.  I would also like her to obtain a blood pressure cuff to have readings done at home.

## 2023-08-31 NOTE — PROGRESS NOTES
Assessment & Plan   Problem List Items Addressed This Visit          Endocrine    Controlled type 2 diabetes mellitus without complication, without long-term current use of insulin (H)     A1c 6.1 today, indicating good control.  Has noted some increased blood sugars with her recent urinary tract infection.  No need for further changes.            Circulatory    Essential hypertension     Uncontrolled with multiple clinic readings.  Somewhat unreliable, as patient reports significant discomfort with blood pressure checks, however due to the degree of elevation, I do think that we need to make adjustments to her medication.  We will increase her lisinopril to 40 mg daily and have her return in 3 to 4 weeks for blood pressure check and updated BMP.  I would also like her to obtain a blood pressure cuff to have readings done at home.         Relevant Medications    lisinopril (ZESTRIL) 40 MG tablet    Other Relevant Orders    Basic metabolic panel  (Ca, Cl, CO2, Creat, Gluc, K, Na, BUN)       Urinary    Acute cystitis with hematuria - Primary     UA today suggestive of acute infection.  No recent antibiotic use or history of recurrent infections.  Does have a sulfa allergy.  Will prescribe Macrobid twice daily x5 days.  Expected therapeutic effects and potential side effects discussed. Reviewed reasons to return to care, including fevers, flank pain, abdominal pain or any other symptoms suggestive of a pyelonephritis.          Relevant Medications    nitroFURantoin macrocrystal-monohydrate (MACROBID) 100 MG capsule     Other Visit Diagnoses       Urinary frequency        Relevant Orders    UA Macroscopic with reflex to Microscopic and Culture (Completed)    Urine Microscopic Exam (Completed)    Urine Culture           RAYNA Camejo CNP  [facility] KENAN Armando is a 73 year old, presenting for the following health issues:  Dysuria (6 days)        8/31/2023     3:22 PM  "  Additional Questions   Roomed by ac   Accompanied by self         8/31/2023     3:06 PM   Patient Reported Additional Medications   Patient reports taking the following new medications None       Symptoms since Friday  Frequent urination. No burning or pain. \"Pressure\" the first day. No hematuria. Thinks she had chills one day but did not take her temperature. No abdominal pain. One day of flank pain (both sides), but has not returned.   Has not been doing any over the counter medications.   Drinking water  No recent UTIs or antibiotics    History of Present Illness       Reason for visit:  Uti  Symptom onset:  1-3 days ago  Symptoms include:  Urinary  Symptom intensity:  Moderate  Symptom progression:  Staying the same  Had these symptoms before:  No  What makes it worse:  No  What makes it better:  No    She eats 2-3 servings of fruits and vegetables daily.She consumes 0 sweetened beverage(s) daily.She exercises with enough effort to increase her heart rate 10 to 19 minutes per day.    She is taking medications regularly.     Review of Systems   Genitourinary:  Positive for dysuria.          Objective    BP (!) 154/82 (BP Location: Right arm, Patient Position: Sitting, Cuff Size: Adult Large)   Pulse 59   Temp 98.1  F (36.7  C) (Oral)   Resp 16   Ht 1.664 m (5' 5.5\")   Wt 87 kg (191 lb 11.2 oz)   SpO2 99%   BMI 31.42 kg/m    Body mass index is 31.42 kg/m .    Physical Exam  Vitals and nursing note reviewed.   Constitutional:       Appearance: Normal appearance. She is not ill-appearing.   Cardiovascular:      Rate and Rhythm: Normal rate and regular rhythm.   Abdominal:      General: Abdomen is flat. There is no distension.      Palpations: Abdomen is soft.      Tenderness: There is no abdominal tenderness. There is no right CVA tenderness, left CVA tenderness, guarding or rebound.   Skin:     General: Skin is warm.      Findings: No rash.   Neurological:      Mental Status: She is alert.   Psychiatric:   "       Mood and Affect: Mood normal.         Behavior: Behavior normal.         Thought Content: Thought content normal.        Results for orders placed or performed in visit on 08/31/23 (from the past 24 hour(s))   UA Macroscopic with reflex to Microscopic and Culture    Specimen: Urine, Midstream   Result Value Ref Range    Color Urine Yellow Colorless, Straw, Light Yellow, Yellow    Appearance Urine Cloudy (A) Clear    Glucose Urine Negative Negative mg/dL    Bilirubin Urine Negative Negative    Ketones Urine Negative Negative mg/dL    Specific Gravity Urine <=1.005 1.005 - 1.030    Blood Urine Small (A) Negative    pH Urine 5.5 5.0 - 8.0    Protein Albumin Urine Negative Negative mg/dL    Urobilinogen Urine 0.2 0.2, 1.0 E.U./dL    Nitrite Urine Negative Negative    Leukocyte Esterase Urine Large (A) Negative   Urine Microscopic Exam   Result Value Ref Range    Bacteria Urine Moderate (A) None Seen /HPF    RBC Urine 0-2 0-2 /HPF /HPF    WBC Urine  (A) 0-5 /HPF /HPF    Squamous Epithelials Urine Moderate (A) None Seen /LPF    WBC Clumps Urine Present (A) None Seen /HPF   Hemoglobin A1c FUTURE 6mo   Result Value Ref Range    Hemoglobin A1C 6.1 (H) 0.0 - 5.6 %   Extra Tube    Narrative    The following orders were created for panel order Extra Tube.  Procedure                               Abnormality         Status                     ---------                               -----------         ------                     Extra Red Top Tube[[id number]]                               In process                 Extra Green Top (Lithium...[[id number]]                      In process                   Please view results for these tests on the individual orders.

## 2023-08-31 NOTE — ASSESSMENT & PLAN NOTE
A1c 6.1 today, indicating good control.  Has noted some increased blood sugars with her recent urinary tract infection.  No need for further changes.

## 2023-09-02 LAB — BACTERIA UR CULT: ABNORMAL

## 2023-09-11 ENCOUNTER — TELEPHONE (OUTPATIENT)
Dept: FAMILY MEDICINE | Facility: CLINIC | Age: 73
End: 2023-09-11
Payer: COMMERCIAL

## 2023-09-11 NOTE — TELEPHONE ENCOUNTER
Would not recommend a refill of antibiotic, as based on her urine culture and guidelines it should have been treated adequately. Would recommend visit for updated urine test. Could be same day clinic/virtual or E-Visit for these orders to be placed to determine appropriate treatment.

## 2023-09-11 NOTE — TELEPHONE ENCOUNTER
General Call    Contacts         Type Contact Phone/Fax    09/11/2023 09:15 AM CDT Phone (Incoming) Prabha Cabral (Self) [phone] (H)          Reason for Call:  Patient Update/Medication Request    What are your questions or concerns:  Patient calling to report that she still has UTI symptoms. She was seen 8/31/2023 and prescribed macrobid; she is requesting refill of antibiotic. Patient declined appointment unless provider recommends. Please advise.    Date of last appointment with provider: 8/31/2023    Could we send this information to you in [employer] or would you prefer to receive a phone call?:   Patient would prefer a phone call   Okay to leave a detailed message?: Yes at Cell number on file:    Telephone Information:   Mobile [phone]

## 2023-09-11 NOTE — TELEPHONE ENCOUNTER
Left message to call back for: Charisse  Information to relay to patient: see below and relay to patient

## 2023-09-13 NOTE — TELEPHONE ENCOUNTER
Spoke with patient she just wanted another antibiotic as she is leaving for [place] today for several weeks.  She will address in [place] if needed with urgent care there.

## 2023-10-04 ENCOUNTER — MYC MEDICAL ADVICE (OUTPATIENT)
Dept: FAMILY MEDICINE | Facility: CLINIC | Age: 73
End: 2023-10-04

## 2023-10-04 ENCOUNTER — TELEPHONE (OUTPATIENT)
Dept: FAMILY MEDICINE | Facility: CLINIC | Age: 73
End: 2023-10-04

## 2023-10-04 NOTE — TELEPHONE ENCOUNTER
Patient Quality Outreach    Patient is due for the following:   Hypertension -  Hypertension follow-up visit    Next Steps:   Schedule a office visit for HTN F/U.    Type of outreach:    Sent [employer] message.      Questions for provider review:    None           Zac Orellana Jr., MA  Chart routed to Care Team.

## 2023-10-14 ENCOUNTER — HEALTH MAINTENANCE LETTER (OUTPATIENT)
Age: 73
End: 2023-10-14

## 2023-10-16 ENCOUNTER — OFFICE VISIT (OUTPATIENT)
Dept: FAMILY MEDICINE | Facility: CLINIC | Age: 73
End: 2023-10-16
Payer: COMMERCIAL

## 2023-10-16 VITALS
BODY MASS INDEX: 30.91 KG/M2 | RESPIRATION RATE: 16 BRPM | WEIGHT: 192.31 LBS | HEART RATE: 70 BPM | OXYGEN SATURATION: 100 % | TEMPERATURE: 98 F | HEIGHT: 66 IN

## 2023-10-16 DIAGNOSIS — Z29.11 NEED FOR VACCINATION AGAINST RESPIRATORY SYNCYTIAL VIRUS: ICD-10-CM

## 2023-10-16 DIAGNOSIS — I10 ESSENTIAL HYPERTENSION: ICD-10-CM

## 2023-10-16 DIAGNOSIS — Z23 NEED FOR PROPHYLACTIC VACCINATION AGAINST HEPATITIS B VIRUS: ICD-10-CM

## 2023-10-16 DIAGNOSIS — R35.0 FREQUENT URINATION: ICD-10-CM

## 2023-10-16 DIAGNOSIS — Z23 NEED FOR SHINGLES VACCINE: ICD-10-CM

## 2023-10-16 DIAGNOSIS — Z12.31 VISIT FOR SCREENING MAMMOGRAM: ICD-10-CM

## 2023-10-16 DIAGNOSIS — N30.01 ACUTE CYSTITIS WITH HEMATURIA: Primary | ICD-10-CM

## 2023-10-16 LAB
ALBUMIN UR-MCNC: NEGATIVE MG/DL
APPEARANCE UR: ABNORMAL
BACTERIA #/AREA URNS HPF: ABNORMAL /HPF
BILIRUB UR QL STRIP: NEGATIVE
COLOR UR AUTO: YELLOW
GLUCOSE UR STRIP-MCNC: NEGATIVE MG/DL
HGB UR QL STRIP: ABNORMAL
KETONES UR STRIP-MCNC: NEGATIVE MG/DL
LEUKOCYTE ESTERASE UR QL STRIP: ABNORMAL
NITRATE UR QL: NEGATIVE
PH UR STRIP: 5.5 [PH] (ref 5–8)
RBC #/AREA URNS AUTO: ABNORMAL /HPF
SP GR UR STRIP: <=1.005 (ref 1–1.03)
SQUAMOUS #/AREA URNS AUTO: ABNORMAL /LPF
UROBILINOGEN UR STRIP-ACNC: 0.2 E.U./DL
WBC #/AREA URNS AUTO: ABNORMAL /HPF
WBC CLUMPS #/AREA URNS HPF: PRESENT /HPF

## 2023-10-16 PROCEDURE — 99213 OFFICE O/P EST LOW 20 MIN: CPT | Mod: 25

## 2023-10-16 PROCEDURE — 91320 SARSCV2 VAC 30MCG TRS-SUC IM: CPT

## 2023-10-16 PROCEDURE — 90480 ADMN SARSCOV2 VAC 1/ONLY CMP: CPT

## 2023-10-16 PROCEDURE — 87186 SC STD MICRODIL/AGAR DIL: CPT

## 2023-10-16 PROCEDURE — G0008 ADMIN INFLUENZA VIRUS VAC: HCPCS | Mod: 59

## 2023-10-16 PROCEDURE — 87086 URINE CULTURE/COLONY COUNT: CPT

## 2023-10-16 PROCEDURE — 81001 URINALYSIS AUTO W/SCOPE: CPT

## 2023-10-16 PROCEDURE — 90662 IIV NO PRSV INCREASED AG IM: CPT

## 2023-10-16 RX ORDER — CEFDINIR 300 MG/1
300 CAPSULE ORAL 2 TIMES DAILY
Qty: 14 CAPSULE | Refills: 0 | Status: SHIPPED | OUTPATIENT
Start: 2023-10-16 | End: 2023-10-23

## 2023-10-16 RX ORDER — RESPIRATORY SYNCYTIAL VIRUS VACCINE 120MCG/0.5
0.5 KIT INTRAMUSCULAR ONCE
Qty: 1 EACH | Refills: 0 | Status: SHIPPED | OUTPATIENT
Start: 2023-10-16 | End: 2023-10-16

## 2023-10-16 NOTE — ASSESSMENT & PLAN NOTE
Patient has not increased her lisinopril, nor purchased a blood pressure cuff to check at home.  Again, remains hypertensive at 154/82 today, but again with significant discomfort during blood pressure check.  She is amenable to purchasing a home cuff, and will send an updated blood pressure readings.  We discussed the disadvantages behind attempting to treat hypertensive conditions when it is unclear what her true blood pressure is.

## 2023-10-16 NOTE — PROGRESS NOTES
Assessment & Plan   Problem List Items Addressed This Visit       Essential hypertension     Patient has not increased her lisinopril, nor purchased a blood pressure cuff to check at home.  Again, remains hypertensive at 154/82 today, but again with significant discomfort during blood pressure check.  She is amenable to purchasing a home cuff, and will send an updated blood pressure readings.  We discussed the disadvantages behind attempting to treat hypertensive conditions when it is unclear what her true blood pressure is.         Acute cystitis with hematuria - Primary     UA in clinic today again suggestive of infection.  She was prescribed Macrobid approximately 6 weeks ago, without resolution of symptoms completely, although they did improve.  Her culture at that time showed susceptibility to Macrobid, so unclear if this is a new or persistent infection.  Has a allergy to sulfa antibiotics, so will prescribe beta lactam with cefdinir twice daily for 7 days.  Low suspicion for pyelonephritis based on absence of fevers and flank pain, but we reviewed reasons to return to care, including any worsening symptoms, persistence of symptoms, or new worrisome symptoms.  Because this is the second UTI treated in the last 2 months, would recommend a visit with urology versus prophylactic therapy if symptoms remain.  Patient expressed understanding of and agreement with this plan.  All questions were answered.         Relevant Medications    cefdinir (OMNICEF) 300 MG capsule     Other Visit Diagnoses       Need for shingles vaccine        Relevant Medications    zoster vaccine recombinant adjuvanted (SHINGRIX) injection    Need for prophylactic vaccination against hepatitis B virus        Relevant Medications    respiratory syncytial virus vaccine, bivalent (ABRYSVO) injection    Need for vaccination against respiratory syncytial virus        Visit for screening mammogram        Relevant Orders    MA SCREENING DIGITAL  "BILAT - Future  (s+30)    Frequent urination        Relevant Orders    UA Macroscopic with reflex to Microscopic and Culture - Clinic Collect (Completed)    Urine Microscopic Exam (Completed)    Urine Culture           RAYNA Camejo CNP  [facility] KENAN Armando is a 73 year old, presenting for the following health issues:  Urinary Problem (UTI sx continue. Frequent urination.)        10/16/2023     1:46 PM   Additional Questions   Roomed by sac   Accompanied by self         10/16/2023     1:46 PM   Patient Reported Additional Medications   Patient reports taking the following new medications no     UTI symptoms have persisted  Was treated for cystitis 8/31, with a culture demonstrating appropriate antibiotic coverage  Experiencing frequency, moreso at night, and pressure intermittently. Doesn't feel like she's emptying her bladder completely.   No abdominal pain, pelvic pain, fevers. No changes to the appearance of her urine, including cloudiness, blood, foul odor.     Review of Systems       Objective    Pulse 70   Temp 98  F (36.7  C) (Oral)   Resp 16   Ht 1.664 m (5' 5.5\")   Wt 87.2 kg (192 lb 5 oz)   SpO2 100%   BMI 31.52 kg/m    Body mass index is 31.52 kg/m .    Physical Exam  Vitals and nursing note reviewed.   Constitutional:       General: She is not in acute distress.     Appearance: Normal appearance.   Cardiovascular:      Rate and Rhythm: Normal rate and regular rhythm.   Abdominal:      General: Abdomen is flat.      Palpations: Abdomen is soft.      Tenderness: There is no right CVA tenderness, left CVA tenderness or guarding.   Skin:     General: Skin is warm.      Findings: No rash.   Neurological:      Mental Status: She is alert.   Psychiatric:         Mood and Affect: Mood normal.         Behavior: Behavior normal.         Thought Content: Thought content normal.        Results for orders placed or performed in visit on 10/16/23 (from the past 24 " hour(s))   UA Macroscopic with reflex to Microscopic and Culture - Clinic Collect    Specimen: Urine, Midstream   Result Value Ref Range    Color Urine Yellow Colorless, Straw, Light Yellow, Yellow    Appearance Urine Slightly Cloudy (A) Clear    Glucose Urine Negative Negative mg/dL    Bilirubin Urine Negative Negative    Ketones Urine Negative Negative mg/dL    Specific Gravity Urine <=1.005 1.005 - 1.030    Blood Urine Trace (A) Negative    pH Urine 5.5 5.0 - 8.0    Protein Albumin Urine Negative Negative mg/dL    Urobilinogen Urine 0.2 0.2, 1.0 E.U./dL    Nitrite Urine Negative Negative    Leukocyte Esterase Urine Moderate (A) Negative   Urine Microscopic Exam   Result Value Ref Range    Bacteria Urine Many (A) None Seen /HPF    RBC Urine 0-2 0-2 /HPF /HPF    WBC Urine  (A) 0-5 /HPF /HPF    Squamous Epithelials Urine Few (A) None Seen /LPF    WBC Clumps Urine Present (A) None Seen /HPF

## 2023-10-16 NOTE — ASSESSMENT & PLAN NOTE
UA in clinic today again suggestive of infection.  She was prescribed Macrobid approximately 6 weeks ago, without resolution of symptoms completely, although they did improve.  Her culture at that time showed susceptibility to Macrobid, so unclear if this is a new or persistent infection.  Has a allergy to sulfa antibiotics, so will prescribe beta lactam with cefdinir twice daily for 7 days.  Low suspicion for pyelonephritis based on absence of fevers and flank pain, but we reviewed reasons to return to care, including any worsening symptoms, persistence of symptoms, or new worrisome symptoms.  Because this is the second UTI treated in the last 2 months, would recommend a visit with urology versus prophylactic therapy if symptoms remain.  Patient expressed understanding of and agreement with this plan.  All questions were answered.

## 2023-10-17 LAB — BACTERIA UR CULT: ABNORMAL

## 2023-10-18 DIAGNOSIS — I10 ESSENTIAL HYPERTENSION: ICD-10-CM

## 2023-10-18 RX ORDER — LISINOPRIL 20 MG/1
20 TABLET ORAL DAILY
Qty: 90 TABLET | Refills: 0 | Status: SHIPPED | OUTPATIENT
Start: 2023-10-18

## 2023-10-20 DIAGNOSIS — E03.9 ACQUIRED HYPOTHYROIDISM: ICD-10-CM

## 2023-10-20 RX ORDER — LEVOTHYROXINE SODIUM 112 UG/1
112 TABLET ORAL DAILY
Qty: 90 TABLET | Refills: 1 | OUTPATIENT
Start: 2023-10-20

## 2023-11-08 NOTE — TELEPHONE ENCOUNTER
Patient Quality Outreach    Patient is due for the following:   Hypertension -  BP check    Next Steps:   No follow up needed at this time.  Pt had appt on 10/16/23 and B/P was still elevated, being followed by PCP.    Type of outreach:    Chart review performed, no outreach needed.      Questions for provider review:    None           Zac Orellana Jr., MA

## 2023-11-14 NOTE — TELEPHONE ENCOUNTER
Patient Quality Outreach    Patient is due for the following:   Diabetes -  BP Check  Hypertension -  BP check  IVD  -  BP Check    Next Steps:   Attempted to call for home blood pressure readings, patient did nto answer and voicemail full    Type of outreach:    Phone, unable ot leave message      Questions for provider review:    None           Anali Interiano RN

## 2023-12-07 ENCOUNTER — TELEPHONE (OUTPATIENT)
Dept: FAMILY MEDICINE | Facility: CLINIC | Age: 73
End: 2023-12-07

## 2023-12-07 NOTE — TELEPHONE ENCOUNTER
Patient Quality Outreach    Patient is due for the following:   Breast Cancer Screening - Mammogram  IVD  -  BP Check and IVD Follow-up Visit    Next Steps:   No follow up needed at this time.    Type of outreach:    Phone, left message for patient/parent to call back. and Sent letter.      Questions for provider review:    None           Iraida Ruiz MA

## 2023-12-31 DIAGNOSIS — E03.9 ACQUIRED HYPOTHYROIDISM: ICD-10-CM

## 2024-01-02 RX ORDER — LEVOTHYROXINE SODIUM 112 UG/1
112 TABLET ORAL DAILY
Qty: 90 TABLET | Refills: 0 | Status: SHIPPED | OUTPATIENT
Start: 2024-01-02

## 2024-02-29 ENCOUNTER — PATIENT OUTREACH (OUTPATIENT)
Dept: GASTROENTEROLOGY | Facility: CLINIC | Age: 74
End: 2024-02-29
Payer: COMMERCIAL

## 2024-10-07 ENCOUNTER — PATIENT OUTREACH (OUTPATIENT)
Dept: CARE COORDINATION | Facility: CLINIC | Age: 74
End: 2024-10-07
Payer: COMMERCIAL

## 2024-10-21 ENCOUNTER — PATIENT OUTREACH (OUTPATIENT)
Dept: CARE COORDINATION | Facility: CLINIC | Age: 74
End: 2024-10-21
Payer: COMMERCIAL

## 2025-07-10 ENCOUNTER — TELEPHONE (OUTPATIENT)
Dept: FAMILY MEDICINE | Facility: CLINIC | Age: 75
End: 2025-07-10
Payer: COMMERCIAL

## 2025-07-10 NOTE — TELEPHONE ENCOUNTER
Patient Quality Outreach    Patient is due for the following:   Hypertension -  Hypertension follow-up visit    Action(s) Taken:   [facility]; does not want any further outreach.    Type of outreach:    Patient is not an established [facility] patient per patient; does not want any further outreach.    Questions for provider review:    None         Iraida Ruiz MA  Chart routed to None.